# Patient Record
Sex: FEMALE | Race: WHITE | Employment: FULL TIME | URBAN - METROPOLITAN AREA
[De-identification: names, ages, dates, MRNs, and addresses within clinical notes are randomized per-mention and may not be internally consistent; named-entity substitution may affect disease eponyms.]

---

## 2017-01-23 RX ORDER — ALPRAZOLAM 0.25 MG/1
0.25 TABLET ORAL NIGHTLY PRN
COMMUNITY
Start: 2006-12-29 | End: 2017-02-13

## 2017-01-23 RX ORDER — LEVOTHYROXINE SODIUM 0.12 MG/1
TABLET ORAL
COMMUNITY
Start: 2009-01-22 | End: 2017-02-06

## 2017-01-23 RX ORDER — OXYBUTYNIN CHLORIDE 5 MG/1
5 TABLET, EXTENDED RELEASE ORAL DAILY
Qty: 90 TABLET | Refills: 0 | Status: SHIPPED | OUTPATIENT
Start: 2017-01-23 | End: 2017-02-13

## 2017-01-23 RX ORDER — CITALOPRAM 20 MG/1
20 TABLET ORAL DAILY
COMMUNITY
Start: 2015-10-23 | End: 2017-02-13

## 2017-01-23 RX ORDER — OXYBUTYNIN CHLORIDE 5 MG/1
TABLET, EXTENDED RELEASE ORAL
COMMUNITY
Start: 2007-01-29 | End: 2017-01-23

## 2017-01-23 RX ORDER — SIMVASTATIN 10 MG
TABLET ORAL
COMMUNITY
Start: 2011-02-01 | End: 2017-04-03 | Stop reason: ALTCHOICE

## 2017-02-04 ENCOUNTER — LAB ENCOUNTER (OUTPATIENT)
Dept: LAB | Age: 67
End: 2017-02-04
Attending: FAMILY MEDICINE
Payer: COMMERCIAL

## 2017-02-04 DIAGNOSIS — E78.9 DISORDER OF LIPOPROTEIN AND LIPID METABOLISM: Primary | ICD-10-CM

## 2017-02-04 DIAGNOSIS — E03.9 HYPOTHYROIDISM: ICD-10-CM

## 2017-02-04 LAB
ALBUMIN SERPL-MCNC: 4 G/DL (ref 3.5–4.8)
ALP LIVER SERPL-CCNC: 125 U/L (ref 50–130)
ALT SERPL-CCNC: 26 U/L (ref 14–54)
AST SERPL-CCNC: 18 U/L (ref 15–41)
BASOPHILS # BLD AUTO: 0.07 X10(3) UL (ref 0–0.1)
BASOPHILS NFR BLD AUTO: 0.7 %
BILIRUB SERPL-MCNC: 0.4 MG/DL (ref 0.1–2)
BUN BLD-MCNC: 17 MG/DL (ref 8–20)
CALCIUM BLD-MCNC: 9.4 MG/DL (ref 8.3–10.3)
CHLORIDE: 105 MMOL/L (ref 101–111)
CHOLEST SMN-MCNC: 188 MG/DL (ref ?–200)
CO2: 27 MMOL/L (ref 22–32)
CREAT BLD-MCNC: 0.87 MG/DL (ref 0.55–1.02)
EOSINOPHIL # BLD AUTO: 0.16 X10(3) UL (ref 0–0.3)
EOSINOPHIL NFR BLD AUTO: 1.7 %
ERYTHROCYTE [DISTWIDTH] IN BLOOD BY AUTOMATED COUNT: 12.1 % (ref 11.5–16)
GLUCOSE BLD-MCNC: 96 MG/DL (ref 70–99)
HCT VFR BLD AUTO: 46.8 % (ref 34–50)
HDLC SERPL-MCNC: 67 MG/DL (ref 45–?)
HDLC SERPL: 2.81 {RATIO} (ref ?–4.44)
HGB BLD-MCNC: 14.6 G/DL (ref 12–16)
IMMATURE GRANULOCYTE COUNT: 0.05 X10(3) UL (ref 0–1)
IMMATURE GRANULOCYTE RATIO %: 0.5 %
LDLC SERPL CALC-MCNC: 99 MG/DL (ref ?–130)
LYMPHOCYTES # BLD AUTO: 1.79 X10(3) UL (ref 0.9–4)
LYMPHOCYTES NFR BLD AUTO: 18.8 %
M PROTEIN MFR SERPL ELPH: 7.6 G/DL (ref 6.1–8.3)
MCH RBC QN AUTO: 30.4 PG (ref 27–33.2)
MCHC RBC AUTO-ENTMCNC: 31.2 G/DL (ref 31–37)
MCV RBC AUTO: 97.5 FL (ref 81–100)
MONOCYTES # BLD AUTO: 0.64 X10(3) UL (ref 0.1–0.6)
MONOCYTES NFR BLD AUTO: 6.7 %
NEUTROPHIL ABS PRELIM: 6.8 X10 (3) UL (ref 1.3–6.7)
NEUTROPHILS # BLD AUTO: 6.8 X10(3) UL (ref 1.3–6.7)
NEUTROPHILS NFR BLD AUTO: 71.6 %
NONHDLC SERPL-MCNC: 121 MG/DL (ref ?–130)
PLATELET # BLD AUTO: 260 10(3)UL (ref 150–450)
POTASSIUM SERPL-SCNC: 4.4 MMOL/L (ref 3.6–5.1)
RBC # BLD AUTO: 4.8 X10(6)UL (ref 3.8–5.1)
RED CELL DISTRIBUTION WIDTH-SD: 43.7 FL (ref 35.1–46.3)
SODIUM SERPL-SCNC: 140 MMOL/L (ref 136–144)
TRIGLYCERIDES: 108 MG/DL (ref ?–150)
TSI SER-ACNC: 0.04 MIU/ML (ref 0.35–5.5)
VLDL: 22 MG/DL (ref 5–40)
WBC # BLD AUTO: 9.5 X10(3) UL (ref 4–13)

## 2017-02-04 PROCEDURE — 84443 ASSAY THYROID STIM HORMONE: CPT

## 2017-02-04 PROCEDURE — 80061 LIPID PANEL: CPT

## 2017-02-04 PROCEDURE — 85025 COMPLETE CBC W/AUTO DIFF WBC: CPT

## 2017-02-04 PROCEDURE — 80053 COMPREHEN METABOLIC PANEL: CPT

## 2017-02-06 DIAGNOSIS — E03.9 HYPOTHYROIDISM, UNSPECIFIED TYPE: Primary | ICD-10-CM

## 2017-02-06 PROBLEM — E78.9 DISORDER OF LIPID METABOLISM: Status: ACTIVE | Noted: 2017-02-06

## 2017-02-06 RX ORDER — LEVOTHYROXINE SODIUM 112 UG/1
112 TABLET ORAL
Qty: 90 TABLET | Refills: 0 | Status: SHIPPED | OUTPATIENT
Start: 2017-02-06 | End: 2017-02-13

## 2017-02-06 NOTE — TELEPHONE ENCOUNTER
Patient advised. Verbalizes understanding. Patient states she has an appointment with Dr. Lilia Parrish for physical - was unable to schedule with Dr. Tiburcio Rich when she called for appointment. To Dr. Tiburcio Rich. Please sign future TSH order.

## 2017-02-06 NOTE — TELEPHONE ENCOUNTER
Reviewed recent labs  Thyroid dose sl high , recommend decrease to 112 mcg strength  Will need recheck TSH in 8 weeks   Other labs good  No other changes to meds  Will discuss further at appt.

## 2017-02-13 ENCOUNTER — OFFICE VISIT (OUTPATIENT)
Dept: FAMILY MEDICINE CLINIC | Facility: CLINIC | Age: 67
End: 2017-02-13

## 2017-02-13 VITALS
BODY MASS INDEX: 30.45 KG/M2 | DIASTOLIC BLOOD PRESSURE: 64 MMHG | WEIGHT: 185 LBS | TEMPERATURE: 98 F | SYSTOLIC BLOOD PRESSURE: 122 MMHG | HEIGHT: 65.5 IN | HEART RATE: 60 BPM

## 2017-02-13 DIAGNOSIS — E03.9 HYPOTHYROIDISM, UNSPECIFIED TYPE: ICD-10-CM

## 2017-02-13 DIAGNOSIS — Z12.39 SCREENING FOR BREAST CANCER: ICD-10-CM

## 2017-02-13 DIAGNOSIS — Z00.00 PHYSICAL EXAM: Primary | ICD-10-CM

## 2017-02-13 PROCEDURE — 99396 PREV VISIT EST AGE 40-64: CPT | Performed by: FAMILY MEDICINE

## 2017-02-13 PROCEDURE — 99212 OFFICE O/P EST SF 10 MIN: CPT | Performed by: FAMILY MEDICINE

## 2017-02-13 RX ORDER — FLUTICASONE PROPIONATE 50 MCG
1 SPRAY, SUSPENSION (ML) NASAL DAILY PRN
COMMUNITY
End: 2017-04-03

## 2017-02-13 RX ORDER — LEVOTHYROXINE SODIUM 112 UG/1
112 TABLET ORAL
Qty: 90 TABLET | Refills: 3 | Status: SHIPPED
Start: 2017-02-13 | End: 2017-10-09

## 2017-02-13 RX ORDER — CITALOPRAM 20 MG/1
20 TABLET ORAL DAILY
Qty: 90 TABLET | Refills: 3 | Status: SHIPPED
Start: 2017-02-13 | End: 2017-10-09

## 2017-02-13 RX ORDER — ALPRAZOLAM 0.25 MG/1
0.25 TABLET ORAL NIGHTLY PRN
Qty: 60 TABLET | Refills: 0 | Status: SHIPPED
Start: 2017-02-13 | End: 2017-04-03

## 2017-02-13 RX ORDER — OXYBUTYNIN CHLORIDE 5 MG/1
5 TABLET, EXTENDED RELEASE ORAL DAILY
Qty: 90 TABLET | Refills: 3 | Status: SHIPPED
Start: 2017-02-13 | End: 2017-10-09

## 2017-02-14 NOTE — PATIENT INSTRUCTIONS
Normal physical today . Advice to schedule mammogram at [facility]. Healthy diet and exercise.

## 2017-02-14 NOTE — PROGRESS NOTES
HPI:     Nehal Delarosa is a 61year old female who presents for an Annual Health Visit. Patient also has history of hypothyroidism. Tolerating medication well. Denies any fatigue, cold intolerance, constipation, diarrhea or any palpitation. lesions or rashes  EYES: no visual complaints or deficits  HEENT: denies nasal congestion, sinus pain or sore throat; hearing loss negative  RESPIRATORY: denies shortness of breath, wheezing or cough   CARDIOVASCULAR: denies chest pain or DBOBS; no palpitati oriented x 3; affect appropriate      ASSESSMENT AND OTHER RELEVANT CHRONIC CONDITIONS:     Meds & Refills for this Visit:  Signed Prescriptions Disp Refills    Levothyroxine Sodium 112 MCG Oral Tab 90 tablet 3      Sig: Take 1 tablet (112 mcg total) by mo metabolism     Encounter for general adult medical examination w/o abnormal findings     Encounter for routine gynecological examination     Hematuria     Hypothyroidism     H/O vaginal hysterectomy     H/O partial mastectomy     Organic mood disorder

## 2017-04-03 ENCOUNTER — OFFICE VISIT (OUTPATIENT)
Dept: FAMILY MEDICINE CLINIC | Facility: CLINIC | Age: 67
End: 2017-04-03

## 2017-04-03 VITALS
WEIGHT: 182.19 LBS | TEMPERATURE: 98 F | HEART RATE: 78 BPM | SYSTOLIC BLOOD PRESSURE: 110 MMHG | DIASTOLIC BLOOD PRESSURE: 64 MMHG | BODY MASS INDEX: 30 KG/M2 | RESPIRATION RATE: 20 BRPM

## 2017-04-03 DIAGNOSIS — F41.9 ANXIETY: ICD-10-CM

## 2017-04-03 DIAGNOSIS — B35.9 RINGWORM: Primary | ICD-10-CM

## 2017-04-03 PROCEDURE — 99214 OFFICE O/P EST MOD 30 MIN: CPT | Performed by: FAMILY MEDICINE

## 2017-04-03 RX ORDER — FLUTICASONE PROPIONATE 50 MCG
1 SPRAY, SUSPENSION (ML) NASAL DAILY PRN
Qty: 16 G | Refills: 0 | Status: SHIPPED | OUTPATIENT
Start: 2017-04-03 | End: 2017-06-21

## 2017-04-03 RX ORDER — ALPRAZOLAM 0.25 MG/1
0.25 TABLET ORAL NIGHTLY PRN
Qty: 90 TABLET | Refills: 0 | Status: SHIPPED | OUTPATIENT
Start: 2017-04-03 | End: 2017-07-24

## 2017-04-03 NOTE — PROGRESS NOTES
[facility] [place]  PROGRESS NOTE  Chief Complaint:   Patient presents with:  Rash: since Febuary       HPI:   This is a 61year old female presents complaining of a rash for past 2 months. Rash has slowly getting worse.   She has applied variou (3) uL   Neutrophil Absolute 6.80 (H) 1.30-6.70 x10(3) uL   Lymphocyte Absolute 1.79 0.90-4.00 x10(3) uL   Monocyte Absolute 0.64 (H) 0.10-0.60 x10(3) uL   Eosinophil Absolute 0.16 0.00-0.30 x10(3) uL   Basophil Absolute 0.07 0.00-0.10 x10(3) uL   Immature Propionate 50 MCG/ACT Nasal Suspension 1 spray by Each Nare route daily as needed for Rhinitis. Disp: 16 g Rfl: 0      Counseling given: Not Answered         REVIEW OF SYSTEMS:   CONSTITUTIONAL:  Denies unusual weight gain/loss, fever, chills, or fatigue. quadrants, no Masses, no hepatosplenomegaly. SKIN: Half-moon shaped erythematous, slightly hyperpigmented dry rash over right buttocks, nontender, no warmth, no drainage present. LYMPHATIC: No cervical lymphadenopathy, no other lymphadenopathy.   Bascom Litten intervertebral disc     Diverticulosis of colon     Disorder of lipid metabolism     Encounter for general adult medical examination w/o abnormal findings     Encounter for routine gynecological examination     Hematuria     Hypothyroidism     H/O vaginal

## 2017-04-03 NOTE — PATIENT INSTRUCTIONS
Continue current medications   Use cream as needed   Keep area clean and dry  Return to clinic if any increase redness, pain, warmth, fever or oozing. Return to clinic in 1-2 weeks if no improvement. Sooner if symptoms gets worse.

## 2017-05-05 ENCOUNTER — TELEPHONE (OUTPATIENT)
Dept: FAMILY MEDICINE CLINIC | Facility: CLINIC | Age: 67
End: 2017-05-05

## 2017-05-05 DIAGNOSIS — E03.9 HYPOTHYROIDISM, UNSPECIFIED TYPE: Primary | ICD-10-CM

## 2017-05-05 NOTE — TELEPHONE ENCOUNTER
Outstanding order for TSH was due on 4/3/17. Patient notified. Patient states she just had TSH done in February and insurance will not cover another one so soon. States her PCP is Dr. Terry Mcginnis. Please advise if this test is still needed or recommended?

## 2017-05-05 NOTE — TELEPHONE ENCOUNTER
Patient states that she cant come in this coming week. Her granddaughter is being born soon. Once things settle down she will call and make appt. No other questions at this time.

## 2017-06-21 RX ORDER — FLUTICASONE PROPIONATE 50 MCG
1 SPRAY, SUSPENSION (ML) NASAL DAILY PRN
Qty: 16 G | Refills: 0 | Status: SHIPPED | OUTPATIENT
Start: 2017-06-21 | End: 2017-10-09

## 2017-07-24 RX ORDER — ALPRAZOLAM 0.25 MG/1
TABLET ORAL
Qty: 60 TABLET | Refills: 3 | Status: SHIPPED | OUTPATIENT
Start: 2017-07-24 | End: 2017-10-09

## 2017-07-24 NOTE — TELEPHONE ENCOUNTER
Future Appointments  Date Time Provider Jackelin Varmai   10/9/2017 5:30 PM Maria Luisa Logan MD [facility] [place] [facility] [place]     Cant come before Sept. Patient states that she owes money to [facility] and until she qualifies for Medicare she cant afford another appt

## 2017-07-27 RX ORDER — ALPRAZOLAM 0.25 MG/1
TABLET ORAL
Qty: 90 TABLET | Refills: 0 | Status: SHIPPED | OUTPATIENT
Start: 2017-07-27 | End: 2017-10-09

## 2017-07-31 ENCOUNTER — TELEPHONE (OUTPATIENT)
Dept: FAMILY MEDICINE CLINIC | Facility: CLINIC | Age: 67
End: 2017-07-31

## 2017-08-01 NOTE — TELEPHONE ENCOUNTER
[facility]ed [facility] states patient just picked up refill of xanax was sent 7/24/17 for 60 with 3 refills. Did not need another Rx will cancel order.

## 2017-08-11 ENCOUNTER — TELEPHONE (OUTPATIENT)
Dept: FAMILY MEDICINE CLINIC | Facility: CLINIC | Age: 67
End: 2017-08-11

## 2017-09-11 ENCOUNTER — TELEPHONE (OUTPATIENT)
Dept: FAMILY MEDICINE CLINIC | Facility: CLINIC | Age: 67
End: 2017-09-11

## 2017-09-11 DIAGNOSIS — Z00.00 HEALTH CARE MAINTENANCE: ICD-10-CM

## 2017-09-11 DIAGNOSIS — E03.9 HYPOTHYROIDISM, UNSPECIFIED TYPE: Primary | ICD-10-CM

## 2017-09-11 NOTE — TELEPHONE ENCOUNTER
Patient has upcoming welcome to medicare px in October. Patient would like bloodwork orders entered so she can have results at .  Please advise

## 2017-09-27 ENCOUNTER — LABORATORY ENCOUNTER (OUTPATIENT)
Dept: LAB | Age: 67
End: 2017-09-27
Attending: FAMILY MEDICINE
Payer: COMMERCIAL

## 2017-09-27 DIAGNOSIS — Z00.00 HEALTH CARE MAINTENANCE: ICD-10-CM

## 2017-09-27 DIAGNOSIS — E03.9 HYPOTHYROIDISM, UNSPECIFIED TYPE: ICD-10-CM

## 2017-09-27 LAB
ALBUMIN SERPL-MCNC: 4 G/DL (ref 3.5–4.8)
ALP LIVER SERPL-CCNC: 118 U/L (ref 50–130)
ALT SERPL-CCNC: 25 U/L (ref 14–54)
AST SERPL-CCNC: 16 U/L (ref 15–41)
BASOPHILS # BLD AUTO: 0.06 X10(3) UL (ref 0–0.1)
BASOPHILS NFR BLD AUTO: 0.7 %
BILIRUB SERPL-MCNC: 0.6 MG/DL (ref 0.1–2)
BUN BLD-MCNC: 19 MG/DL (ref 8–20)
CALCIUM BLD-MCNC: 9.3 MG/DL (ref 8.3–10.3)
CHLORIDE: 103 MMOL/L (ref 101–111)
CHOLEST SMN-MCNC: 220 MG/DL (ref ?–200)
CO2: 27 MMOL/L (ref 22–32)
CREAT BLD-MCNC: 0.93 MG/DL (ref 0.55–1.02)
EOSINOPHIL # BLD AUTO: 0.07 X10(3) UL (ref 0–0.3)
EOSINOPHIL NFR BLD AUTO: 0.8 %
ERYTHROCYTE [DISTWIDTH] IN BLOOD BY AUTOMATED COUNT: 12.3 % (ref 11.5–16)
GLUCOSE BLD-MCNC: 92 MG/DL (ref 70–99)
HCT VFR BLD AUTO: 46.3 % (ref 34–50)
HDLC SERPL-MCNC: 69 MG/DL (ref 45–?)
HDLC SERPL: 3.19 {RATIO} (ref ?–4.44)
HGB BLD-MCNC: 14.8 G/DL (ref 12–16)
IMMATURE GRANULOCYTE COUNT: 0.04 X10(3) UL (ref 0–1)
IMMATURE GRANULOCYTE RATIO %: 0.5 %
LDLC SERPL CALC-MCNC: 133 MG/DL (ref ?–130)
LDLC SERPL-MCNC: 18 MG/DL (ref 5–40)
LYMPHOCYTES # BLD AUTO: 1.73 X10(3) UL (ref 0.9–4)
LYMPHOCYTES NFR BLD AUTO: 19.9 %
M PROTEIN MFR SERPL ELPH: 7.6 G/DL (ref 6.1–8.3)
MCH RBC QN AUTO: 30.6 PG (ref 27–33.2)
MCHC RBC AUTO-ENTMCNC: 32 G/DL (ref 31–37)
MCV RBC AUTO: 95.9 FL (ref 81–100)
MONOCYTES # BLD AUTO: 0.57 X10(3) UL (ref 0.1–0.6)
MONOCYTES NFR BLD AUTO: 6.6 %
NEUTROPHIL ABS PRELIM: 6.21 X10 (3) UL (ref 1.3–6.7)
NEUTROPHILS # BLD AUTO: 6.21 X10(3) UL (ref 1.3–6.7)
NEUTROPHILS NFR BLD AUTO: 71.5 %
NONHDLC SERPL-MCNC: 151 MG/DL (ref ?–130)
PLATELET # BLD AUTO: 276 10(3)UL (ref 150–450)
POTASSIUM SERPL-SCNC: 4.7 MMOL/L (ref 3.6–5.1)
RBC # BLD AUTO: 4.83 X10(6)UL (ref 3.8–5.1)
RED CELL DISTRIBUTION WIDTH-SD: 42.5 FL (ref 35.1–46.3)
SODIUM SERPL-SCNC: 139 MMOL/L (ref 136–144)
TRIGLYCERIDES: 89 MG/DL (ref ?–150)
TSI SER-ACNC: 0.13 MIU/ML (ref 0.35–5.5)
WBC # BLD AUTO: 8.7 X10(3) UL (ref 4–13)

## 2017-09-27 PROCEDURE — 84443 ASSAY THYROID STIM HORMONE: CPT

## 2017-09-27 PROCEDURE — 80053 COMPREHEN METABOLIC PANEL: CPT

## 2017-09-27 PROCEDURE — 85025 COMPLETE CBC W/AUTO DIFF WBC: CPT

## 2017-09-27 PROCEDURE — 36415 COLL VENOUS BLD VENIPUNCTURE: CPT

## 2017-09-27 PROCEDURE — 80061 LIPID PANEL: CPT

## 2017-09-28 ENCOUNTER — TELEPHONE (OUTPATIENT)
Dept: FAMILY MEDICINE CLINIC | Facility: CLINIC | Age: 67
End: 2017-09-28

## 2017-09-28 NOTE — TELEPHONE ENCOUNTER
----- Message from Td Nathan MD sent at 9/27/2017  9:30 PM CDT -----  Thyroid level good  Chol sl higher, but HDL remains good  No changes in meds  Will discuss at appt

## 2017-10-09 ENCOUNTER — OFFICE VISIT (OUTPATIENT)
Dept: FAMILY MEDICINE CLINIC | Facility: CLINIC | Age: 67
End: 2017-10-09

## 2017-10-09 VITALS
RESPIRATION RATE: 16 BRPM | DIASTOLIC BLOOD PRESSURE: 70 MMHG | TEMPERATURE: 98 F | WEIGHT: 178 LBS | SYSTOLIC BLOOD PRESSURE: 122 MMHG | HEIGHT: 66 IN | HEART RATE: 68 BPM | BODY MASS INDEX: 28.61 KG/M2

## 2017-10-09 DIAGNOSIS — J01.00 ACUTE NON-RECURRENT MAXILLARY SINUSITIS: ICD-10-CM

## 2017-10-09 DIAGNOSIS — F41.9 ANXIETY: ICD-10-CM

## 2017-10-09 DIAGNOSIS — Z00.00 HEALTH CARE MAINTENANCE: ICD-10-CM

## 2017-10-09 DIAGNOSIS — E03.9 HYPOTHYROIDISM, UNSPECIFIED TYPE: Primary | ICD-10-CM

## 2017-10-09 PROCEDURE — 99214 OFFICE O/P EST MOD 30 MIN: CPT | Performed by: FAMILY MEDICINE

## 2017-10-09 RX ORDER — CITALOPRAM 20 MG/1
20 TABLET ORAL DAILY
Qty: 90 TABLET | Refills: 1 | Status: SHIPPED | OUTPATIENT
Start: 2017-10-09 | End: 2018-01-01

## 2017-10-09 RX ORDER — LEVOFLOXACIN 500 MG/1
500 TABLET, FILM COATED ORAL DAILY
Qty: 10 TABLET | Refills: 0 | Status: SHIPPED | OUTPATIENT
Start: 2017-10-09 | End: 2017-10-19

## 2017-10-09 RX ORDER — LEVOTHYROXINE SODIUM 112 UG/1
112 TABLET ORAL
Qty: 90 TABLET | Refills: 1 | Status: SHIPPED | OUTPATIENT
Start: 2017-10-09 | End: 2018-01-01

## 2017-10-09 RX ORDER — CITALOPRAM 20 MG/1
20 TABLET ORAL DAILY
Qty: 90 TABLET | Refills: 1 | Status: SHIPPED | OUTPATIENT
Start: 2017-10-09 | End: 2017-10-09

## 2017-10-09 RX ORDER — FLUTICASONE PROPIONATE 50 MCG
1 SPRAY, SUSPENSION (ML) NASAL DAILY PRN
Qty: 3 BOTTLE | Refills: 6 | Status: SHIPPED | OUTPATIENT
Start: 2017-10-09 | End: 2018-01-01

## 2017-10-09 RX ORDER — OXYBUTYNIN CHLORIDE 5 MG/1
5 TABLET, EXTENDED RELEASE ORAL DAILY
Qty: 90 TABLET | Refills: 1 | Status: SHIPPED | OUTPATIENT
Start: 2017-10-09 | End: 2018-01-01

## 2017-10-09 RX ORDER — ALPRAZOLAM 0.25 MG/1
0.25 TABLET ORAL NIGHTLY PRN
Qty: 60 TABLET | Refills: 6 | Status: SHIPPED | OUTPATIENT
Start: 2017-10-09 | End: 2018-01-01

## 2017-10-10 ENCOUNTER — TELEPHONE (OUTPATIENT)
Dept: FAMILY MEDICINE CLINIC | Facility: CLINIC | Age: 67
End: 2017-10-10

## 2017-10-10 NOTE — TELEPHONE ENCOUNTER
Pharmacy called regarding medications that were sent in yesterday. Levaquin and citalopram. These two will counteract with each other and can cause QT prolongation and arrhythmias. Do you still want pharmacy to fill medications?  Please advise

## 2017-10-10 NOTE — PROGRESS NOTES
[address]  PROGRESS NOTE  Chief Complaint:   Patient presents with:  Lab Results: from 9/27  Medication Request: Celexa, Flonase, Xanax, Synthroid, Nystatin, Ditroptan. Pt would like paper scripts to take to pharmacy herself.    Sinus Pr 4.0 3.5 - 4.8 g/dL   Sodium 139 136 - 144 mmol/L   Potassium 4.7 3.6 - 5.1 mmol/L   Chloride 103 101 - 111 mmol/L   CO2 27.0 22.0 - 32.0 mmol/L   -LIPID PANEL   Result Value Ref Range   Cholesterol, Total 220 (H) <200 mg/dL   Triglycerides 89 <150 mg/dL Disease Father    • Coronary artery disease [OTHER] Father    • Stroke Paternal Grandmother      Allergies:    Penicillins             Itching  Current Meds:    Current Outpatient Prescriptions:  levofloxacin (LEVAQUIN) 500 MG Oral Tab Take 1 tablet (500 m in the extremities,change in bowel or bladder control. HEMATOLOGIC:  Denies anemia, bleeding or bruising. LYMPHATICS:  Denies enlarged nodes or history of splenectomy. PSYCHIATRIC:  Denies depression or anxiety.   ENDOCRINOLOGIC:  Denies excessive sweati levofloxacin (LEVAQUIN) 500 MG Oral Tab; Take 1 tablet (500 mg total) by mouth daily.  -     ALPRAZolam 0.25 MG Oral Tab; Take 1 tablet (0.25 mg total) by mouth nightly as needed for Sleep.   -     Discontinue: Citalopram Hydrobromide (CELEXA) 20 MG Oral Ta List:  Patient Active Problem List:     Abnormal electrocardiogram     S/P appendectomy     History of colonic polyps     Degeneration of lumbar or lumbosacral intervertebral disc     Diverticulosis of colon     Disorder of lipid metabolism     Hematuria

## 2018-01-01 ENCOUNTER — TELEPHONE (OUTPATIENT)
Dept: HEMATOLOGY/ONCOLOGY | Facility: HOSPITAL | Age: 68
End: 2018-01-01

## 2018-01-01 ENCOUNTER — SOCIAL WORK SERVICES (OUTPATIENT)
Dept: HEMATOLOGY/ONCOLOGY | Facility: HOSPITAL | Age: 68
End: 2018-01-01

## 2018-01-01 ENCOUNTER — HOSPITAL ENCOUNTER (OUTPATIENT)
Dept: RADIATION ONCOLOGY | Facility: HOSPITAL | Age: 68
Discharge: HOME | End: 2018-01-01
Attending: RADIOLOGY
Payer: MEDICARE

## 2018-01-01 ENCOUNTER — OFFICE VISIT (OUTPATIENT)
Dept: HEMATOLOGY/ONCOLOGY | Facility: HOSPITAL | Age: 68
End: 2018-01-01
Attending: INTERNAL MEDICINE
Payer: MEDICARE

## 2018-01-01 ENCOUNTER — TELEPHONE (OUTPATIENT)
Dept: FAMILY MEDICINE CLINIC | Facility: CLINIC | Age: 68
End: 2018-01-01

## 2018-01-01 ENCOUNTER — HOSPITAL ENCOUNTER (OUTPATIENT)
Dept: NUCLEAR MEDICINE | Facility: HOSPITAL | Age: 68
Discharge: HOME | End: 2018-01-01
Attending: INTERNAL MEDICINE
Payer: MEDICARE

## 2018-01-01 ENCOUNTER — DIETICIAN VISIT (OUTPATIENT)
Dept: HEMATOLOGY/ONCOLOGY | Facility: HOSPITAL | Age: 68
End: 2018-01-01

## 2018-01-01 ENCOUNTER — RESEARCH ENCOUNTER (OUTPATIENT)
Dept: HEMATOLOGY/ONCOLOGY | Facility: HOSPITAL | Age: 68
End: 2018-01-01

## 2018-01-01 ENCOUNTER — OFFICE VISIT (OUTPATIENT)
Dept: FAMILY MEDICINE CLINIC | Facility: CLINIC | Age: 68
End: 2018-01-01
Payer: COMMERCIAL

## 2018-01-01 ENCOUNTER — TELEPHONE (OUTPATIENT)
Dept: RADIATION ONCOLOGY | Age: 68
End: 2018-01-01

## 2018-01-01 ENCOUNTER — MED REC SCAN ONLY (OUTPATIENT)
Dept: HEMATOLOGY/ONCOLOGY | Facility: HOSPITAL | Age: 68
End: 2018-01-01

## 2018-01-01 ENCOUNTER — HOSPITAL ENCOUNTER (OUTPATIENT)
Dept: GENERAL RADIOLOGY | Age: 68
Discharge: HOME | End: 2018-01-01
Attending: FAMILY MEDICINE
Payer: MEDICARE

## 2018-01-01 VITALS
HEART RATE: 80 BPM | WEIGHT: 184.19 LBS | DIASTOLIC BLOOD PRESSURE: 78 MMHG | SYSTOLIC BLOOD PRESSURE: 128 MMHG | RESPIRATION RATE: 20 BRPM | HEIGHT: 66 IN | BODY MASS INDEX: 29.6 KG/M2 | TEMPERATURE: 98 F

## 2018-01-01 VITALS
BODY MASS INDEX: 28.93 KG/M2 | HEIGHT: 65.98 IN | RESPIRATION RATE: 18 BRPM | OXYGEN SATURATION: 98 % | TEMPERATURE: 98 F | WEIGHT: 180 LBS | SYSTOLIC BLOOD PRESSURE: 138 MMHG | HEART RATE: 70 BPM | DIASTOLIC BLOOD PRESSURE: 80 MMHG

## 2018-01-01 VITALS
RESPIRATION RATE: 18 BRPM | WEIGHT: 175.19 LBS | HEART RATE: 78 BPM | OXYGEN SATURATION: 99 % | BODY MASS INDEX: 28.15 KG/M2 | HEIGHT: 65.98 IN | SYSTOLIC BLOOD PRESSURE: 121 MMHG | TEMPERATURE: 98 F | DIASTOLIC BLOOD PRESSURE: 68 MMHG

## 2018-01-01 VITALS
OXYGEN SATURATION: 97 % | TEMPERATURE: 98 F | BODY MASS INDEX: 28.83 KG/M2 | HEART RATE: 101 BPM | DIASTOLIC BLOOD PRESSURE: 77 MMHG | RESPIRATION RATE: 18 BRPM | WEIGHT: 179.38 LBS | HEIGHT: 65.98 IN | SYSTOLIC BLOOD PRESSURE: 147 MMHG

## 2018-01-01 VITALS
SYSTOLIC BLOOD PRESSURE: 128 MMHG | HEIGHT: 65.98 IN | HEART RATE: 83 BPM | OXYGEN SATURATION: 91 % | DIASTOLIC BLOOD PRESSURE: 63 MMHG | WEIGHT: 180 LBS | TEMPERATURE: 98 F | RESPIRATION RATE: 20 BRPM | BODY MASS INDEX: 28.93 KG/M2

## 2018-01-01 DIAGNOSIS — C79.51 BONE METASTASES (HCC): ICD-10-CM

## 2018-01-01 DIAGNOSIS — B02.9 HERPES ZOSTER WITHOUT COMPLICATION: ICD-10-CM

## 2018-01-01 DIAGNOSIS — C79.31 BRAIN METASTASES (HCC): ICD-10-CM

## 2018-01-01 DIAGNOSIS — M50.30 DEGENERATIVE DISC DISEASE, CERVICAL: ICD-10-CM

## 2018-01-01 DIAGNOSIS — F41.9 ANXIETY: ICD-10-CM

## 2018-01-01 DIAGNOSIS — C34.02 LUNG CANCER, MAIN BRONCHUS, LEFT (HCC): ICD-10-CM

## 2018-01-01 DIAGNOSIS — G89.3 CANCER ASSOCIATED PAIN: ICD-10-CM

## 2018-01-01 DIAGNOSIS — E03.9 HYPOTHYROIDISM, UNSPECIFIED TYPE: ICD-10-CM

## 2018-01-01 DIAGNOSIS — R55 NEAR SYNCOPE: Primary | ICD-10-CM

## 2018-01-01 DIAGNOSIS — I63.449 CEREBROVASCULAR ACCIDENT (CVA) DUE TO EMBOLIC OCCLUSION OF CEREBELLAR ARTERY (HCC): ICD-10-CM

## 2018-01-01 DIAGNOSIS — I82.412 ACUTE DEEP VEIN THROMBOSIS (DVT) OF FEMORAL VEIN OF LEFT LOWER EXTREMITY (HCC): ICD-10-CM

## 2018-01-01 DIAGNOSIS — R60.0 BILATERAL LEG EDEMA: ICD-10-CM

## 2018-01-01 DIAGNOSIS — I26.99 OTHER ACUTE PULMONARY EMBOLISM WITHOUT ACUTE COR PULMONALE (HCC): ICD-10-CM

## 2018-01-01 DIAGNOSIS — C79.31 BRAIN METASTASES (HCC): Primary | ICD-10-CM

## 2018-01-01 DIAGNOSIS — C34.02 MALIGNANT NEOPLASM OF LEFT MAIN BRONCHUS (HCC): ICD-10-CM

## 2018-01-01 DIAGNOSIS — C79.31 SECONDARY CANCER OF BRAIN (HCC): Primary | ICD-10-CM

## 2018-01-01 DIAGNOSIS — C34.02 LUNG CANCER, MAIN BRONCHUS, LEFT (HCC): Primary | ICD-10-CM

## 2018-01-01 DIAGNOSIS — R58 ECCHYMOSIS: ICD-10-CM

## 2018-01-01 DIAGNOSIS — C79.31 BRAIN METASTASIS (HCC): Primary | ICD-10-CM

## 2018-01-01 DIAGNOSIS — C34.82 MALIGNANT NEOPLASM OF OVERLAPPING SITES OF LEFT LUNG (HCC): Primary | ICD-10-CM

## 2018-01-01 DIAGNOSIS — M50.30 DEGENERATIVE DISC DISEASE, CERVICAL: Primary | ICD-10-CM

## 2018-01-01 DIAGNOSIS — C34.82 MALIGNANT NEOPLASM OF OVERLAPPING SITES OF LEFT LUNG (HCC): ICD-10-CM

## 2018-01-01 DIAGNOSIS — C34.02 MALIGNANT NEOPLASM OF LEFT MAIN BRONCHUS (HCC): Primary | ICD-10-CM

## 2018-01-01 DIAGNOSIS — B37.49 CANDIDIASIS OF UROGENITAL SITE: ICD-10-CM

## 2018-01-01 DIAGNOSIS — C34.90 MALIGNANT NEOPLASM OF BRONCHUS AND LUNG (HCC): Primary | ICD-10-CM

## 2018-01-01 DIAGNOSIS — B37.0 ORAL PHARYNGEAL CANDIDIASIS: ICD-10-CM

## 2018-01-01 DIAGNOSIS — Z71.9 ENCOUNTER FOR HEALTH EDUCATION: ICD-10-CM

## 2018-01-01 LAB
ALBUMIN SERPL-MCNC: 3.8 G/DL (ref 3.5–4.8)
ALBUMIN/GLOB SERPL: 1 {RATIO} (ref 1–2)
ALP LIVER SERPL-CCNC: 176 U/L (ref 50–130)
ALT SERPL-CCNC: 26 U/L (ref 14–54)
ANION GAP SERPL CALC-SCNC: 8 MMOL/L (ref 0–18)
AST SERPL-CCNC: 16 U/L (ref 15–41)
BASOPHILS # BLD AUTO: 0.06 X10(3) UL (ref 0–0.1)
BASOPHILS NFR BLD AUTO: 0.5 %
BILIRUB SERPL-MCNC: 0.2 MG/DL (ref 0.1–2)
BUN BLD-MCNC: 21 MG/DL (ref 8–20)
BUN/CREAT SERPL: 21.4 (ref 10–20)
CALCIUM BLD-MCNC: 9 MG/DL (ref 8.3–10.3)
CHLORIDE SERPL-SCNC: 107 MMOL/L (ref 101–111)
CO2 SERPL-SCNC: 27 MMOL/L (ref 22–32)
CREAT BLD-MCNC: 0.98 MG/DL (ref 0.55–1.02)
EOSINOPHIL # BLD AUTO: 0.17 X10(3) UL (ref 0–0.3)
EOSINOPHIL NFR BLD AUTO: 1.5 %
ERYTHROCYTE [DISTWIDTH] IN BLOOD BY AUTOMATED COUNT: 12.5 % (ref 11.5–16)
GLOBULIN PLAS-MCNC: 3.7 G/DL (ref 2.5–4)
GLUCOSE BLD-MCNC: 110 MG/DL (ref 70–99)
HCT VFR BLD AUTO: 42.1 % (ref 34–50)
HGB BLD-MCNC: 13.8 G/DL (ref 12–16)
IMMATURE GRANULOCYTE COUNT: 0.05 X10(3) UL (ref 0–1)
IMMATURE GRANULOCYTE RATIO %: 0.4 %
LYMPHOCYTES # BLD AUTO: 2.38 X10(3) UL (ref 0.9–4)
LYMPHOCYTES NFR BLD AUTO: 20.7 %
M PROTEIN MFR SERPL ELPH: 7.5 G/DL (ref 6.1–8.3)
MCH RBC QN AUTO: 31.7 PG (ref 27–33.2)
MCHC RBC AUTO-ENTMCNC: 32.8 G/DL (ref 31–37)
MCV RBC AUTO: 96.8 FL (ref 81–100)
MONOCYTES # BLD AUTO: 0.81 X10(3) UL (ref 0.1–1)
MONOCYTES NFR BLD AUTO: 7.1 %
NEUTROPHIL ABS PRELIM: 8.01 X10 (3) UL (ref 1.3–6.7)
NEUTROPHILS # BLD AUTO: 8.01 X10(3) UL (ref 1.3–6.7)
NEUTROPHILS NFR BLD AUTO: 69.8 %
OSMOLALITY SERPL CALC.SUM OF ELEC: 298 MOSM/KG (ref 275–295)
PLATELET # BLD AUTO: 307 10(3)UL (ref 150–450)
POTASSIUM SERPL-SCNC: 3.9 MMOL/L (ref 3.6–5.1)
RBC # BLD AUTO: 4.35 X10(6)UL (ref 3.8–5.1)
RED CELL DISTRIBUTION WIDTH-SD: 44.3 FL (ref 35.1–46.3)
SODIUM SERPL-SCNC: 142 MMOL/L (ref 136–144)
T4 FREE SERPL-MCNC: 1.1 NG/DL (ref 0.9–1.8)
TSI SER-ACNC: 3.16 MIU/ML (ref 0.35–5.5)
WBC # BLD AUTO: 11.5 X10(3) UL (ref 4–13)

## 2018-01-01 PROCEDURE — 72050 X-RAY EXAM NECK SPINE 4/5VWS: CPT | Performed by: FAMILY MEDICINE

## 2018-01-01 PROCEDURE — 77399 UNLISTED PX MED RADJ PHYSICS: CPT | Performed by: RADIOLOGY

## 2018-01-01 PROCEDURE — 99215 OFFICE O/P EST HI 40 MIN: CPT | Performed by: CLINICAL NURSE SPECIALIST

## 2018-01-01 PROCEDURE — 77470 SPECIAL RADIATION TREATMENT: CPT | Performed by: RADIOLOGY

## 2018-01-01 PROCEDURE — 80053 COMPREHEN METABOLIC PANEL: CPT | Performed by: FAMILY MEDICINE

## 2018-01-01 PROCEDURE — 77300 RADIATION THERAPY DOSE PLAN: CPT | Performed by: RADIOLOGY

## 2018-01-01 PROCEDURE — 77334 RADIATION TREATMENT AID(S): CPT | Performed by: RADIOLOGY

## 2018-01-01 PROCEDURE — 77290 THER RAD SIMULAJ FIELD CPLX: CPT | Performed by: RADIOLOGY

## 2018-01-01 PROCEDURE — 77373 STRTCTC BDY RAD THER TX DLVR: CPT | Performed by: RADIOLOGY

## 2018-01-01 PROCEDURE — 77280 THER RAD SIMULAJ FIELD SMPL: CPT | Performed by: RADIOLOGY

## 2018-01-01 PROCEDURE — 99215 OFFICE O/P EST HI 40 MIN: CPT | Performed by: INTERNAL MEDICINE

## 2018-01-01 PROCEDURE — 82962 GLUCOSE BLOOD TEST: CPT

## 2018-01-01 PROCEDURE — 77307 TELETHX ISODOSE PLAN CPLX: CPT | Performed by: RADIOLOGY

## 2018-01-01 PROCEDURE — 99205 OFFICE O/P NEW HI 60 MIN: CPT | Performed by: INTERNAL MEDICINE

## 2018-01-01 PROCEDURE — 99214 OFFICE O/P EST MOD 30 MIN: CPT | Performed by: FAMILY MEDICINE

## 2018-01-01 PROCEDURE — 85025 COMPLETE CBC W/AUTO DIFF WBC: CPT | Performed by: FAMILY MEDICINE

## 2018-01-01 PROCEDURE — 84439 ASSAY OF FREE THYROXINE: CPT | Performed by: FAMILY MEDICINE

## 2018-01-01 PROCEDURE — 77336 RADIATION PHYSICS CONSULT: CPT | Performed by: RADIOLOGY

## 2018-01-01 PROCEDURE — 84443 ASSAY THYROID STIM HORMONE: CPT | Performed by: FAMILY MEDICINE

## 2018-01-01 PROCEDURE — 78815 PET IMAGE W/CT SKULL-THIGH: CPT | Performed by: INTERNAL MEDICINE

## 2018-01-01 PROCEDURE — 77295 3-D RADIOTHERAPY PLAN: CPT | Performed by: RADIOLOGY

## 2018-01-01 PROCEDURE — 93000 ELECTROCARDIOGRAM COMPLETE: CPT | Performed by: FAMILY MEDICINE

## 2018-01-01 RX ORDER — CITALOPRAM 20 MG/1
TABLET ORAL
Qty: 90 TABLET | Refills: 1 | Status: SHIPPED | OUTPATIENT
Start: 2018-01-01 | End: 2018-01-01

## 2018-01-01 RX ORDER — ALPRAZOLAM 0.25 MG/1
TABLET ORAL
Qty: 60 TABLET | Refills: 5 | Status: SHIPPED | OUTPATIENT
Start: 2018-01-01 | End: 2018-01-01

## 2018-01-01 RX ORDER — FLUCONAZOLE 100 MG/1
TABLET ORAL
Qty: 8 TABLET | Refills: 1 | Status: SHIPPED | OUTPATIENT
Start: 2018-01-01

## 2018-01-01 RX ORDER — AMLODIPINE BESYLATE 10 MG/1
10 TABLET ORAL DAILY
Qty: 90 TABLET | Refills: 0 | Status: SHIPPED | OUTPATIENT
Start: 2018-01-01

## 2018-01-01 RX ORDER — HYDROCODONE BITARTRATE AND ACETAMINOPHEN 5; 325 MG/1; MG/1
1 TABLET ORAL 3 TIMES DAILY PRN
Qty: 90 TABLET | Refills: 0 | Status: SHIPPED | OUTPATIENT
Start: 2018-01-01

## 2018-01-01 RX ORDER — ENOXAPARIN SODIUM 100 MG/ML
1 INJECTION SUBCUTANEOUS 2 TIMES DAILY
Qty: 13 SYRINGE | Refills: 3 | Status: SHIPPED | OUTPATIENT
Start: 2018-01-01

## 2018-01-01 RX ORDER — ENOXAPARIN SODIUM 100 MG/ML
1 INJECTION SUBCUTANEOUS 2 TIMES DAILY
Qty: 13 SYRINGE | Refills: 3 | Status: SHIPPED | OUTPATIENT
Start: 2018-01-01 | End: 2018-01-01

## 2018-01-01 RX ORDER — PANTOPRAZOLE SODIUM 40 MG/1
40 TABLET, DELAYED RELEASE ORAL
Qty: 30 TABLET | Refills: 3 | Status: SHIPPED | OUTPATIENT
Start: 2018-01-01

## 2018-01-01 RX ORDER — ALPRAZOLAM 0.25 MG/1
0.25 TABLET ORAL 3 TIMES DAILY PRN
Qty: 90 TABLET | Refills: 5 | Status: SHIPPED | OUTPATIENT
Start: 2018-01-01 | End: 2018-01-01

## 2018-01-01 RX ORDER — HYDROCODONE BITARTRATE AND ACETAMINOPHEN 5; 325 MG/1; MG/1
1 TABLET ORAL 3 TIMES DAILY PRN
Refills: 0 | COMMUNITY
Start: 2018-01-01 | End: 2018-01-01

## 2018-01-01 RX ORDER — VALACYCLOVIR HYDROCHLORIDE 1 G/1
1 TABLET, FILM COATED ORAL 3 TIMES DAILY
Qty: 21 TABLET | Refills: 0 | Status: SHIPPED | OUTPATIENT
Start: 2018-01-01 | End: 2019-01-01

## 2018-01-01 RX ORDER — ALBUTEROL SULFATE 90 UG/1
2 AEROSOL, METERED RESPIRATORY (INHALATION) EVERY 6 HOURS PRN
Qty: 1 INHALER | Refills: 1 | Status: SHIPPED | OUTPATIENT
Start: 2018-01-01

## 2018-01-01 RX ORDER — DRONABINOL 2.5 MG/1
2.5 CAPSULE ORAL
Qty: 60 CAPSULE | Refills: 0 | Status: SHIPPED | OUTPATIENT
Start: 2018-01-01

## 2018-01-01 RX ORDER — HYDROCODONE BITARTRATE AND ACETAMINOPHEN 10; 325 MG/1; MG/1
1-2 TABLET ORAL EVERY 6 HOURS PRN
Qty: 180 TABLET | Refills: 0 | Status: SHIPPED | OUTPATIENT
Start: 2018-01-01

## 2018-01-01 RX ORDER — CYANOCOBALAMIN 1000 UG/ML
1000 INJECTION INTRAMUSCULAR; SUBCUTANEOUS ONCE
Status: CANCELLED | OUTPATIENT
Start: 2019-01-01

## 2018-01-01 RX ORDER — DEXAMETHASONE 4 MG/1
4 TABLET ORAL 2 TIMES DAILY WITH MEALS
Qty: 60 TABLET | Refills: 0 | Status: SHIPPED | OUTPATIENT
Start: 2018-01-01 | End: 2018-01-01 | Stop reason: ALTCHOICE

## 2018-01-01 RX ORDER — DEXAMETHASONE 4 MG/1
TABLET ORAL 3 TIMES DAILY
COMMUNITY
End: 2018-01-01

## 2018-01-01 RX ORDER — HYDROCODONE BITARTRATE AND ACETAMINOPHEN 5; 325 MG/1; MG/1
1 TABLET ORAL 3 TIMES DAILY PRN
Qty: 10 TABLET | Refills: 0 | Status: SHIPPED | OUTPATIENT
Start: 2018-01-01 | End: 2018-01-01

## 2018-01-01 RX ORDER — ATORVASTATIN CALCIUM 20 MG/1
20 TABLET, FILM COATED ORAL DAILY
Qty: 30 TABLET | Refills: 11 | Status: SHIPPED | OUTPATIENT
Start: 2018-01-01 | End: 2019-01-30

## 2018-01-01 RX ORDER — ATORVASTATIN CALCIUM 20 MG/1
TABLET, FILM COATED ORAL
Refills: 0 | COMMUNITY
Start: 2018-01-01 | End: 2018-01-01

## 2018-01-01 RX ORDER — HYDROXYZINE HYDROCHLORIDE 25 MG/1
TABLET, FILM COATED ORAL
COMMUNITY
Start: 2018-01-01 | End: 2018-01-01

## 2018-01-01 RX ORDER — ALPRAZOLAM 0.25 MG/1
0.25 TABLET ORAL 3 TIMES DAILY PRN
Qty: 90 TABLET | Refills: 5 | Status: SHIPPED
Start: 2018-01-01

## 2018-01-01 RX ORDER — HYDROCHLOROTHIAZIDE 25 MG/1
25 TABLET ORAL DAILY
Qty: 30 TABLET | Refills: 2 | Status: SHIPPED | OUTPATIENT
Start: 2018-01-01

## 2018-01-01 RX ORDER — ASPIRIN 325 MG
325 TABLET ORAL DAILY
COMMUNITY

## 2018-01-01 RX ORDER — LEVOTHYROXINE SODIUM 112 UG/1
TABLET ORAL
Qty: 90 TABLET | Refills: 1 | Status: SHIPPED | OUTPATIENT
Start: 2018-01-01

## 2018-04-10 NOTE — TELEPHONE ENCOUNTER
Future appt:  None  Last Appointment:  10/9/2017; No f/u recommended    Cholesterol, Total (mg/dL)   Date Value   09/27/2017 220 (H)   ----------  HDL Cholesterol (mg/dL)   Date Value   09/27/2017 69   ----------  LDL Cholesterol (mg/dL)   Date Value   09/27/2017 133 (H)   ----------  Triglycerides (mg/dL)   Date Value   09/27/2017 89   ----------  No results found for: EAG, A1C    Lab Results  Component Value Date   TSH 0.131 (L) 09/27/2017     Last RF:  10/9/2017    No Follow-up on file.

## 2018-07-09 NOTE — TELEPHONE ENCOUNTER
Pt seen per dermatology today, in [facility] for dermatitis. S/s started after pt went camping 2 wks ago. Pt states rash started along hairline, spread into scalp, also also along her sides under her arms, left thigh and right leg.   Pt states itching made s/

## 2018-08-09 NOTE — TELEPHONE ENCOUNTER
Pt states that she traveled in her truck for several hours on a bench seat. Since then she has been unable to sit, stand or walk for very long. She states that she has a hx of back issues. She went to see a Chiropractor and X-rays were done.  She was told t

## 2018-08-18 NOTE — TELEPHONE ENCOUNTER
Informed patient of the following below. Patient is wondering if anything about a bone spur was noted in the xray? Patient states that Chiropractor stated that she had one. Dr Ngozi Rodriguez can you please advise on Monday. Thank you.

## 2018-08-18 NOTE — TELEPHONE ENCOUNTER
----- Message from Aron Brandon MD sent at 8/17/2018  4:57 PM CDT -----  Radiology review shows mod arthritis in neck,  If testing of neck arteries ok, will be able to work with chiropracter

## 2018-08-20 PROBLEM — M50.30 DEGENERATIVE DISC DISEASE, CERVICAL: Status: ACTIVE | Noted: 2018-01-01

## 2018-08-20 NOTE — PROGRESS NOTES
[facility] [place]  PROGRESS NOTE  Chief Complaint:   Patient presents with:  Neck Pain: needs ultrasound of arteries in order for chiropractor to work on neck, medication refills      HPI:   This is a 74year old female coming in for evaluation patient.       Results for orders placed or performed in visit on 13/00/77  -COMP METABOLIC PANEL (14)   Result Value Ref Range   Glucose 110 (H) 70 - 99 mg/dL   Sodium 142 136 - 144 mmol/L   Potassium 3.9 3.6 - 5.1 mmol/L   Chloride 107 101 - 111 mmol/L Ovaries remains  No date: OTHER      Comment: Bilateral breast implant, ovarian cyst removed  Social History:  Smoking status: Former Smoker                                                              Packs/day: 0.00      Years: 0.00         Quit date: 2/ camping earlier this spring. Patient was treated by dermatology and the hives have now resolved.   CARDIOVASCULAR:  Denies chest pain, chest pressure, chest discomfort, palpitations, edema, dyspnea on exertion or at rest.SEE HPI  RESPIRATORY:  Denies short side.  SKIN: No rashes, no skin lesion, no bruising, good turgor. HEART:  Regular rate and rhythm, no murmurs, rubs or gallops.,  No obvious carotid bruits bilaterally. LUNGS: Clear to auscultation bilterally, no rales/rhonchi/wheezing.   CHEST: No tender does provide partial relief for her. Encourage good fluid intake.     Health Maintenance:  Mammogram due on 09/20/1950  FIT Colorectal Screening due on 09/20/2002  Colonoscopy due on 03/23/2014  DEXA Scan due on 09/20/2017  Pneumococcal PPSV23/PCV13 65+ Karla Morfin

## 2018-08-23 NOTE — TELEPHONE ENCOUNTER
Pt notified and verbalized understanding. Dr. Eliu Santiago reviewed the results of the [facility] that pt had done at [facility] [place] on 8/21/18. Pre Dr. Eliu Santiago : Neck arteries are good, may see chiropractor. Report sent to scan.

## 2018-08-28 NOTE — TELEPHONE ENCOUNTER
1. Pt is looking for carotid doppler result. Report was placed into scanning on 8/23- not available for viewing as this time under media. Pt states that Dr. Axel Cardenas is needing results- pt is asking for Dr. Sharon Michele to call result to #[phone].

## 2018-08-28 NOTE — TELEPHONE ENCOUNTER
Requesting to speak to Talya Dong.  Patient did not want to give detail, she said Talya Dong will know what it was about

## 2018-08-29 NOTE — TELEPHONE ENCOUNTER
Dr. Cristin Fung office was notified as below. Left detailed message for pt- asked Yasir Aquino to call with any other questions/concerns.

## 2018-08-29 NOTE — TELEPHONE ENCOUNTER
Doppler studies were  Normal, bilat, phone note on 8/23 says pt was notified.   May notify Dr Shaw José Miguel office

## 2018-09-27 NOTE — TELEPHONE ENCOUNTER
Is having a hard time with [employer] and referral to chiropractic office - requesting to speak to nurse

## 2018-09-28 NOTE — TELEPHONE ENCOUNTER
Pt called stating that she needs a referral retro dated back to 8/6/18 for [facility]. She states that she has had 12 visits there. She was confused when choosing the insurance plans and was using her traditional Medicare card.  She had actu

## 2018-10-01 NOTE — TELEPHONE ENCOUNTER
Pt was not seen till 8/17, for neck problems. Referral can go back to visit but if not in network or approved prior to visit, unsure if will be approved. As all notes are time stamped in computer, it is impossible to do anything different.

## 2018-10-01 NOTE — TELEPHONE ENCOUNTER
Arin notified and verbalized understanding. She will wait to see if approved from 8/17 to present.  Please advise referral.

## 2018-10-24 NOTE — TELEPHONE ENCOUNTER
Called and spoke with the referral department about Chiropractor referral. Spring Boyd in referral department states that her insurance isn't covered in Dr Pamela Hernandez. Spring Boyd gave me who is in the patient's insurance network for patient.       Called and i

## 2018-10-29 NOTE — TELEPHONE ENCOUNTER
Future appt:    Last Appointment:  8/17/2018; No f/u recommended    Cholesterol, Total (mg/dL)   Date Value   09/27/2017 220 (H)     HDL Cholesterol (mg/dL)   Date Value   09/27/2017 69     LDL Cholesterol (mg/dL)   Date Value   09/27/2017 133 (H)     Trig

## 2018-11-16 NOTE — TELEPHONE ENCOUNTER
Please advise refill of Citalopram 20mg.   Last Rx: 10/9/17    Future appt:    Last Appointment:  8/17/2018      Cholesterol, Total (mg/dL)   Date Value   09/27/2017 220 (H)     HDL Cholesterol (mg/dL)   Date Value   09/27/2017 69     LDL Cholesterol (mg/dL

## 2018-12-04 NOTE — TELEPHONE ENCOUNTER
Patient was not able to get pet scan at [address] because of insurance, facility is not affiliated with [facility] patient needs referral to get pet scan done at [facility].

## 2018-12-04 NOTE — TELEPHONE ENCOUNTER
Spoke with Cee Isabel and advised him to call pt's insurance company  To find in network specialists because pt is IHP. He verbalized understanding and will call insurance. Cee Isabel also states that pt has an appt with Dr. Chano Russell (oncology) on Thursday.  They are

## 2018-12-04 NOTE — TELEPHONE ENCOUNTER
referral needed for Dr Christie Marina, appointment with him tomorrow morning 8:30, also sign off on pet scan order?  Please advise

## 2018-12-04 NOTE — TELEPHONE ENCOUNTER
Simran Guerrero returned call and states that pt needs to have a pet scan done for staging of lung cancer.  She states that due to pt's insurance he cannot order the test and have it covered at [facility] so he is asking that Dr. Ivory Baker order it so that is can be do

## 2018-12-04 NOTE — TELEPHONE ENCOUNTER
Ivett Bell states that pt needs the referral to have pet scan done at [facility] because of her insurance. I told him that I have left a message and am awaiting a return call from [facility] at the [facility] to try and figure out what is needed.  He

## 2018-12-04 NOTE — TELEPHONE ENCOUNTER
needs referrel sent so she can get pet scan done as soon as possible at [facility]

## 2018-12-05 NOTE — TELEPHONE ENCOUNTER
has appt there tomorrow   and needs REFERRAL TODAY for eval and treatment    fax#  [phone]                CALL OR FAX TODAY   12/5    this cannot wait for Thursday. ...

## 2018-12-05 NOTE — TELEPHONE ENCOUNTER
Order placed  May schedule appt, but needs to make sure approved prior to test  Please call referral Dept to expedite

## 2018-12-05 NOTE — TELEPHONE ENCOUNTER
Spoke with Adriano Chavarria, pt's son, and he is going to have the oncologist fax over the PET Scan order to my attention.

## 2018-12-05 NOTE — TELEPHONE ENCOUNTER
Oncology referral signed, please help thru approval  Pt should be able to make sppt  Would be helpful if PET scan done first

## 2018-12-05 NOTE — TELEPHONE ENCOUNTER
Referral to Dr. Holly Morrell denied because he is out of network. Janet Pierce notified and asks for a referral to Dr. Loy Guzman (hem/onc) in network. Please advise.

## 2018-12-05 NOTE — TELEPHONE ENCOUNTER
Received fax of order form Oncologist. 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

## 2018-12-05 NOTE — TELEPHONE ENCOUNTER
Naila Talamantes called stating that he spoke with Evelyne Cockayne at Dr. Vincent Boxer office and was told that if they want pt to get in ASAP then Dr. Brandi Martinez needs to call and talk to Dr. Joy Coelho and give him the information regarding the urgency.  Per Naila Talamantes pt has lung cancer that met

## 2018-12-05 NOTE — TELEPHONE ENCOUNTER
Gabi called this morning and states that he spoke with pt's insurance and all the specialists are in network that she is seeing. Closing this encounter due to multiple encounters for same issues. See other phone note dated 12/4/18.

## 2018-12-05 NOTE — TELEPHONE ENCOUNTER
Unable to find Dr Shana Stack, always need speciality with referral request.  Need to see if Dr is in network, if not in network, referral will not be approved. Due to insurance , will only be able to see in-network physicians.    Verify dose of lovenox

## 2018-12-05 NOTE — TELEPHONE ENCOUNTER
Referral to see Dr. Cindy Enriquez faxed to [facility] with a note stating that authorization is still pending per Epic.

## 2018-12-06 NOTE — TELEPHONE ENCOUNTER
All records received from [facility] and faxed to Dr. Jose Francisco Venegas at [phone]. Shanika Najera notified and verbalized understanding. Requests that Lovenox script be sent to [facility] because they have a full box of syringes.  The computer will not let

## 2018-12-06 NOTE — TELEPHONE ENCOUNTER
Letter completed, please fax. PET scan is now being managed by oncology. Needs to stay on loveox, not coumadin.

## 2018-12-06 NOTE — TELEPHONE ENCOUNTER
Deo Hernandez notified that letter has been sent and that pt needs to remain on Lovenox. He states that Lovenox is 80mg BID. Pt needs 13 doses to get her through until her appt with Oncologist. Please send to [facility].  Script pending with correct pharmacy

## 2018-12-06 NOTE — TELEPHONE ENCOUNTER
needs on [facility] letter head with Dr Aretha Dickey on it to     request for records for Heena Recinos : 1950  STAT     from [facility]      fax to [phone] attn: [place] / Medical Records                Please call back when records arrive

## 2018-12-06 NOTE — TELEPHONE ENCOUNTER
Dayo Nath notified and verbalized understanding. He is working on getting records from [facility]. Referral to Dr. Rutledge Child and MRI Brain results faxed to her office at number listed below.  Dayo Nath informed that I will call him back as soon as Dr. Drew Atkins gets a ca

## 2018-12-07 NOTE — TELEPHONE ENCOUNTER
Pt definitely needs to remain on thyroid med daily.   May need levels checked once into treatment for lung

## 2018-12-07 NOTE — TELEPHONE ENCOUNTER
Please advise refill. This was prescribed by a Dr. Collins Hampton while she was in the hospital. Per Med Reconciliation in [facility] she was prescribed Norco 5-325mg on 11/30, #40 (14 day supply).

## 2018-12-07 NOTE — TELEPHONE ENCOUNTER
Pt called back stating that she has enough Norco for 3 days. Script pending - please check quantity. She states that she thinks in the hospital they were giving her an extra pill here and there.

## 2018-12-07 NOTE — TELEPHONE ENCOUNTER
Patient got list of medication from the hospital, on that list Levothyroxine was listed. Patient hasn't been taking that medication and was wondering if she's suppose to take it.

## 2018-12-07 NOTE — TELEPHONE ENCOUNTER
Margoth Duarte states that when she was given the discharge med list it did not have Levothyroxine on it. She states that she has not taken it in 13 days. She would like to know if she should be? Please advise.

## 2018-12-11 NOTE — TELEPHONE ENCOUNTER
Patient was prescribed #60 with 5 refills of alprazolam on 10/29/18. Patient should not need a refill. Pharmacist states that patient has requested to  a refill and it is too soon.      States due to her new diagnosis of brain cancer patient

## 2018-12-12 NOTE — CONSULTS
Cancer Center Report of Consultation    Patient Name: Nilam Ramesh   YOB: 1950   Medical Record Number: [id number]   CSN: [id number]   Consulting Physician: Manolo Diego MD  Referring Physician(s): Dr. Beverley Cantor paratracheal node which was nonspecific. She had dopplers of the LE which showed LLE DVT. An IVC filter was placed and was started on SUBQ heparin. NSG was consulted and eventually she was placed on Lovenox.      CT guided biopsy of the left lung mass was p Bilateral breast implant, ovarian cyst removed   • OTHER SURGICAL HISTORY      breast augmentation       Family Medical History:  Family History   Problem Relation Age of Onset   • Heart Disease Mother    • Cancer Mother         lung cancer   • Other (Lung times daily as needed. , Disp: 90 tablet, Rfl: 0  •  ALPRAZOLAM 0.25 MG Oral Tab, Take 1 tablet (0.25 mg total) by mouth 3 (three) times daily as needed for Sleep., Disp: 90 tablet, Rfl: 5  •  dexamethasone (DECADRON) 4 MG tablet, Take 1 tablet (4 mg total) induration. Good bowel sounds. Extremities: Pedal pulses are present. BLE edema L>R. Neurological: Grossly intact. Lymphatics: There is no palpable lymphadenopathy throughout in the cervical, supraclavicular, axillary, or inguinal regions.   Psych/Depre 09/27/2017 0902    Sodium 140 02/04/2017 0825    Potassium 3.9 08/17/2018 1639    Potassium 4.7 09/27/2017 0902    Potassium 4.4 02/04/2017 0825    Chloride 107 08/17/2018 1639    Chloride 103 09/27/2017 0902    Chloride 105 02/04/2017 0825    CO2 27.0 08/ lobe extending from the hilum to the apex. This measures 3.6 x 5.4 x 6.5 cm (image 70)  The SUV max measures 6.9. This is compatible with the recently diagnosed lung cancer. There   is a small left pleural effusion. This is not hypermetabolic.   MEDIAST clinically              Dictated by (CST): Shiela Dewitt MD on 12/12/2018 at 11:47       Approved by (CST): Shiela Dewitt MD on 12/12/2018 at 12:32         Impression & Plan:  1.  Newly diagnosed metastatic lung cancer- her case was presented at t continues on Lovenox. She absolutely hates these injections as has had significant pain in her injection sites and she says she has no other areas where she can further receive injections w/o having pain.  She was tearful about this and says she desperately general treatment was explained to the patient and the family.       MD [facility]

## 2018-12-12 NOTE — PATIENT INSTRUCTIONS
Dr. Luis Manuel Zhang nurse line Monday through Friday 84:30:  [phone]  After hours or weekends for emergent needs:  [phone].      To schedule diagnostic testing, Central [phone]  For Medical Records: [phone]
BACK PAIN

## 2018-12-12 NOTE — PROGRESS NOTES
EROS met with patient and her family. Patient has been diagnosed with metastatic lung cancer to the brain. EROS went over the Medical Cann[employer] application. They are going to get this completed and will mail into [place].  EROS completed the physician portion

## 2018-12-13 NOTE — CONSULTS
[facility]    PATIENT'S NAME: Reagan Cast MAYELA   RADIATION ONCOLOGIST: Fabiola Montgomery. Steffanie Jones M.D.    PATIENT ACC[place]: [de-identified] [place]   [facility]   MEDICAL RECORD #: [id number] YOB: 1950   CONSULTATION DATE: 12/12/2018 She also has some back pain, which is understandable, given her PET scan findings and her diffuse vertebral body disease. However, she states that this is fairly well controlled with Norco, and her pain level is low.     Complicating matters somewhat i There is no supraclavicular, axillary, or inguinal lymphadenopathy. ABDOMEN:  Benign. IMPRESSION:  This is a 80-year-old female with a recent diagnosis of fairly diffusely metastatic lung cancer.   She will be undergoing chemotherapy under Dr. Carmela Knowles discussion regarding the risks and benefits of the stereotactic radiosurgery, the patient indicated that she understood all these issues and does wish to proceed with treatment as I previously dictated.     Given that her insurance will be changing after Noti

## 2018-12-13 NOTE — PROGRESS NOTES
STUDY: [facility]  ID # [id number]  CONSENT NOTE     Patient presents with new diagnosis of NSCLC. Unknown EGFR status. Biopsy performed on 12/5/18 confirming diagnosis. PET scan showing metastatic disease.   Dr. Regine Metz spoke to patient and family regarding

## 2018-12-17 NOTE — TELEPHONE ENCOUNTER
Patient's son, Derrell Hicks, calling asking for referral for Radiation from Dr. Denia Tomas. Had CT SIM done this morning. Spoke to Cite Dionisio Knowles from billing about this.   Per Manisha once Radiation has decided on the plan for patient's treatment then she will send in another r

## 2018-12-18 NOTE — PROGRESS NOTES
ANP Visit Note    Patient Name: Lindy Curiel   YOB: 1950   Medical Record Number: [id number]   CSN: [id number]   Date of visit: 12/17/2018       Chief Complaint/Reason for Visit:  Patient presents with:  Edema: APN assessment; lower testing were pending.      She was seen by Oncology during her hospital stay and a PET scan was recommended outpatient. Her insurance requires that she come here for treatment.  Her insurance will be changing as of January 1st.      She had her PET scan her History   Problem Relation Age of Onset   • Heart Disease Mother    • Cancer Mother         lung cancer   • Other (Lung cancer) Mother    • Other (Coronary artery disease) Mother    • Heart Disease Father    • Other (Coronary artery disease) Father    • St Rfl: 3  •  HYDROcodone-acetaminophen 5-325 MG Oral Tab, Take 1 tablet by mouth 3 (three) times daily as needed. , Disp: 90 tablet, Rfl: 0  •  ALPRAZOLAM 0.25 MG Oral Tab, Take 1 tablet (0.25 mg total) by mouth 3 (three) times daily as needed for Sleep., Dis L>R  Neurological: Grossly intact. Lymphatics: There is no palpable lymphadenopathy throughout in the cervical,supraclavicular, axillary, or inguinal regions. Psych/Depression: mood and affect are appropriate.      Labs:  Patient Active Problem List  POC

## 2018-12-18 NOTE — TELEPHONE ENCOUNTER
Spoke to pt's son today after  having had a conversation with him yesterday as well. PT's son Jessica Patel is adamant about having been told by Dr. Stephanie Espinoza that his mother who had her SIM yesterday for radiation therapy mapping, would start RT today.

## 2018-12-19 NOTE — TELEPHONE ENCOUNTER
Son was told that we have not received the labs that are supposed to come in from [facility].  Also told that the application for medical marijuana has been filled out by [facility], signed by Dr. Nishi Wills, and mailed to [facility] based on 12/12/18 noted f

## 2018-12-19 NOTE — TELEPHONE ENCOUNTER
Estrella Santiago notified and that results were received. He states that some results are pending still and they will be faxed on Friday.

## 2018-12-19 NOTE — TELEPHONE ENCOUNTER
Re:   let him know when we recieve test results from other facility.   - They are faxing them over for Dr. Marianna Lawrence

## 2018-12-19 NOTE — PROGRESS NOTES
EROS reached out to resource in [facility] who advises that cannabis card has been approved, and the patient should receive the card in about 14 business days. Patient's son Kwadwo Bae advised.

## 2018-12-20 NOTE — TELEPHONE ENCOUNTER
Spoke to Haroldo from [facility] Pathology Dept to follow up the Lung panel results. Per Haroldo, they are still working with a rep, they needed more material and did not have enough, will work on a partial sample. Dr. Regine Metz aware.  Also updated pa

## 2018-12-21 PROBLEM — C34.92 PRIMARY MALIGNANT NEOPLASM OF LEFT LUNG METASTATIC TO OTHER SITE (HCC): Status: ACTIVE | Noted: 2018-01-01

## 2018-12-21 NOTE — TELEPHONE ENCOUNTER
Spoke to [employer] results showed no mutations. Latest update from path at [facility] today was they did not have enough tissue to send for PDL-1 testing and could only send part of mutation panel- just ROS1 and ALK could be sent.  I explained to her

## 2018-12-21 NOTE — TELEPHONE ENCOUNTER
Spoke to patient's son, Otto Dalton. Wanted to verify what Dr. Ellen Whiet told the patient. Read Dr. Meghan Soto notes to him.  Also informed that per [facility] pathology department, Lung panel (ROS-1 and ALK) should be back on Monday as they were told by the

## 2018-12-21 NOTE — TELEPHONE ENCOUNTER
Verbal Consent -  Ok to speak to [employer]. She wants to make sure he is also aware of everything going on with her. I told her she will have paper work to update when she comes in next - she understood.

## 2018-12-27 NOTE — TELEPHONE ENCOUNTER
Spoke to Dee from [employer] (spoke to Haroldo earlier) to give IR a heads up that Dr. Carmela Knowles also ordered for biopsy for this patient and would requests that this is done the same day that she gets her port placed. Dee to leave a message to Haroldo.  Aware cami

## 2018-12-27 NOTE — PATIENT INSTRUCTIONS
Lovenox injection until Sunday. Start Eliquis 5 mg on Monday - take 10 mg (2 tablets of 5 mg)  twice daily for 7 days, then 5 mg twice daily until further orders.   If still on Lovenox prior when scheduled for PORT placement, will need to stop Lovenox 1 da

## 2018-12-27 NOTE — PROGRESS NOTES
[facility] Progress Note    Patient Name: Marc Carson   YOB: 1950   Medical Record Number: [id number]   CSN: [id number]   Attending Physician: Zoltan Johnson M.D.    Referring Physician: Darvin Riggs MD      Date of Visit: 12/ mucin producing. Mucicarmine was positive. IHC showed the tumor was positive for CK7, napsin,  and negative for CK20, TTF-1, WT1, CK 5/6 and p63. Profile was felt to be compatible with lung primary but breast and female GYN could not be excluded.  August Ee (Coronary artery disease) Mother    • Heart Disease Father    • Other (Coronary artery disease) Father    • Stroke Paternal Grandmother        Psychosocial History:  Social History    Socioeconomic History      Marital status:       Spouse name: Not hydrochlorothiazide 25 MG Oral Tab, Take 1 tablet (25 mg total) by mouth daily. , Disp: 30 tablet, Rfl: 2  •  Pantoprazole Sodium 40 MG Oral Tab EC, Take 1 tablet (40 mg total) by mouth every morning before breakfast., Disp: 30 tablet, Rfl: 3  •  HYDROcodon No palpable lymphadenopathy. Neck is supple. Chest: Diminished BS Left  Heart: Regular rate and rhythm. Abdomen: Soft, injection sites tender with some bruising noted and palpable areas on induration. Good bowel sounds.   Extremities: Pedal pulses are pr 79 12/28/2018 1221    GFR, -American 70 08/17/2018 1639    GFR, Non- 69 12/28/2018 1221    GFR, Non- 61 08/17/2018 1639    Calcium, Total 8.4 12/28/2018 1221    Calcium, Total 9.0 08/17/2018 1639    Calcium, Total 9.3 2.0 max SUV, and hepatic blood pool is 3.3 max SUV. HEAD/NECK:    There is misregistration the head secondary to patient motion. There is asymmetrically increased activity in a right jugular lymph node.   This does not persist on the non attenuation co cancer  2. Hypermetabolic activity in the left hilum is suspicious for left hilar involvement  3. Hypermetabolic left adrenal mass is highly suspicious for adrenal metastasis  4. Widespread osseous metastases in the pelvis, vertebral column and ribs  5.  Co tries to avoid taking more than 2-3 tablets a day. Encouraged her to do so, Renewed prescription for Norco. Will have palliative care see as well.  If pain medications and with initiating systemic therapy she continues to have significant pain will consider

## 2018-12-27 NOTE — TELEPHONE ENCOUNTER
Son informed that chemotherapy has been authorized and that the patient is scheduled for this on 1/3/19 at 10 AM.  Called IR and left a message for them to schedule patient for PORT placement.  Also informed the son that Dr. Natalia Stafford would also like to have

## 2018-12-27 NOTE — PROGRESS NOTES
[facility] Radiation Treatment Management Note 1-5    Patient:  Keyur Cason  Age:  79year old  Visit Diagnosis:    1.  Secondary cancer of brain [facility])      Primary Rad/Onc:  Dr. Willam White    Site Delivered Dose (Gy)

## 2018-12-28 NOTE — PROGRESS NOTES
Chemotherapy Education    Learner:  Patient, Spouse and Family Member    Barriers / Limitations:  Pain and Drowsy from medication    Chemotherapy education goals:  · Learn the drug names  · Administration schedule  · Routes of administration  · Treatment s menstrual irregularity and vaginal dryness:  Achieved    Notify MD/RN of any changes or problems:  Achieved    Comments:    Treatment Effects on Emotional Status:    Potential mood changes, depression, nervousness, difficulty sleeping:  Achieved    Importa passing out  Achieved      Teaching Materials Provided:     [employer] Chemotherapy information sheets   ACS Nutrition for the Person with Cancer during Treatment / Dietician information sheet  When to contact the Treatment Team Information Sheet  Side Effe

## 2018-12-30 PROBLEM — C79.51 BONE METASTASES: Status: ACTIVE | Noted: 2018-01-01

## 2018-12-30 PROBLEM — C79.51 BONE METASTASES (HCC): Status: ACTIVE | Noted: 2018-01-01

## 2018-12-31 NOTE — TELEPHONE ENCOUNTER
called, asked if ok to hold aspirin for biopsy and PAC, told him yes. She will get last dose of Lovenox on Tuesday night at 5 pm, instructed to start Eliquis 5 mg bid on Thursday am. Marinol did not help her symptoms. Will stop.

## 2019-01-01 ENCOUNTER — TELEPHONE (OUTPATIENT)
Dept: HEMATOLOGY/ONCOLOGY | Facility: HOSPITAL | Age: 69
End: 2019-01-01

## 2019-01-01 ENCOUNTER — OFFICE VISIT (OUTPATIENT)
Dept: HEMATOLOGY/ONCOLOGY | Facility: HOSPITAL | Age: 69
End: 2019-01-01
Attending: INTERNAL MEDICINE
Payer: MEDICARE

## 2019-01-01 ENCOUNTER — TELEPHONE (OUTPATIENT)
Dept: SURGERY | Facility: CLINIC | Age: 69
End: 2019-01-01

## 2019-01-01 ENCOUNTER — TELEPHONE (OUTPATIENT)
Dept: FAMILY MEDICINE CLINIC | Facility: CLINIC | Age: 69
End: 2019-01-01

## 2019-01-01 ENCOUNTER — HOSPITAL ENCOUNTER (OUTPATIENT)
Dept: CT IMAGING | Facility: HOSPITAL | Age: 69
Discharge: HOME | End: 2019-01-01
Attending: INTERNAL MEDICINE
Payer: MEDICARE

## 2019-01-01 ENCOUNTER — APPOINTMENT (OUTPATIENT)
Dept: LAB | Facility: HOSPITAL | Age: 69
End: 2019-01-01
Attending: INTERNAL MEDICINE
Payer: MEDICARE

## 2019-01-01 ENCOUNTER — HOSPITAL ENCOUNTER (OUTPATIENT)
Dept: RADIATION ONCOLOGY | Facility: HOSPITAL | Age: 69
Discharge: HOME | End: 2019-01-01
Attending: RADIOLOGY
Payer: MEDICARE

## 2019-01-01 ENCOUNTER — HOSPITAL ENCOUNTER (OUTPATIENT)
Dept: INTERVENTIONAL RADIOLOGY/VASCULAR | Facility: HOSPITAL | Age: 69
Discharge: HOME | End: 2019-01-01
Attending: INTERNAL MEDICINE | Admitting: INTERNAL MEDICINE
Payer: MEDICARE

## 2019-01-01 VITALS
HEART RATE: 87 BPM | OXYGEN SATURATION: 98 % | SYSTOLIC BLOOD PRESSURE: 121 MMHG | DIASTOLIC BLOOD PRESSURE: 69 MMHG | RESPIRATION RATE: 19 BRPM

## 2019-01-01 VITALS
RESPIRATION RATE: 16 BRPM | SYSTOLIC BLOOD PRESSURE: 125 MMHG | OXYGEN SATURATION: 95 % | WEIGHT: 180 LBS | HEIGHT: 66 IN | HEART RATE: 68 BPM | DIASTOLIC BLOOD PRESSURE: 72 MMHG | BODY MASS INDEX: 28.93 KG/M2 | TEMPERATURE: 98 F

## 2019-01-01 VITALS
HEIGHT: 65.55 IN | RESPIRATION RATE: 16 BRPM | HEART RATE: 84 BPM | SYSTOLIC BLOOD PRESSURE: 146 MMHG | BODY MASS INDEX: 30.39 KG/M2 | TEMPERATURE: 98 F | OXYGEN SATURATION: 95 % | WEIGHT: 184.63 LBS | DIASTOLIC BLOOD PRESSURE: 73 MMHG

## 2019-01-01 DIAGNOSIS — C79.51 BONE METASTASES (HCC): ICD-10-CM

## 2019-01-01 DIAGNOSIS — C34.82 MALIGNANT NEOPLASM OF OVERLAPPING SITES OF LEFT LUNG (HCC): ICD-10-CM

## 2019-01-01 DIAGNOSIS — C34.82 MALIGNANT NEOPLASM OF OVERLAPPING SITES OF LEFT LUNG (HCC): Primary | ICD-10-CM

## 2019-01-01 LAB
ADEQUACY OF SPECIMEN: ADEQUATE
ALBUMIN SERPL-MCNC: 3.1 G/DL (ref 3.1–4.5)
ALBUMIN/GLOB SERPL: 0.9 {RATIO} (ref 1–2)
ALP LIVER SERPL-CCNC: 198 U/L (ref 55–142)
ALT SERPL-CCNC: 65 U/L (ref 14–54)
ANION GAP SERPL CALC-SCNC: 11 MMOL/L (ref 0–18)
AST SERPL-CCNC: 45 U/L (ref 15–41)
BAND %: 3 %
BASOPHIL % MANUAL: 0 %
BASOPHIL ABSOLUTE MANUAL: 0 X10(3) UL (ref 0–0.1)
BILIRUB SERPL-MCNC: 0.9 MG/DL (ref 0.1–2)
BUN BLD-MCNC: 23 MG/DL (ref 8–20)
BUN/CREAT SERPL: 29.5 (ref 10–20)
CALCIUM BLD-MCNC: 8.5 MG/DL (ref 8.3–10.3)
CHLORIDE SERPL-SCNC: 96 MMOL/L (ref 101–111)
CO2 SERPL-SCNC: 27 MMOL/L (ref 22–32)
CREAT BLD-MCNC: 0.78 MG/DL (ref 0.55–1.02)
EOSINOPHIL % MANUAL: 0 %
EOSINOPHIL ABSOLUTE MANUAL: 0 X10(3) UL (ref 0–0.3)
ERYTHROCYTE [DISTWIDTH] IN BLOOD BY AUTOMATED COUNT: 14.1 % (ref 11.5–16)
ERYTHROCYTE [DISTWIDTH] IN BLOOD BY AUTOMATED COUNT: 14.2 % (ref 11.5–16)
GLOBULIN PLAS-MCNC: 3.4 G/DL (ref 2.8–4.4)
GLUCOSE BLD-MCNC: 123 MG/DL (ref 70–99)
HCT VFR BLD AUTO: 32 % (ref 34–50)
HCT VFR BLD AUTO: 34.6 % (ref 34–50)
HGB BLD-MCNC: 10.6 G/DL (ref 12–16)
HGB BLD-MCNC: 10.9 G/DL (ref 12–16)
INR BLD: 1.02 (ref 0.9–1.1)
LYMPHOCYTE % MANUAL: 5 %
LYMPHOCYTE ABSOLUTE MANUAL: 1.18 X10(3) UL (ref 0.9–4)
M PROTEIN MFR SERPL ELPH: 6.5 G/DL (ref 6.4–8.2)
MCH RBC QN AUTO: 29.9 PG (ref 27–33.2)
MCH RBC QN AUTO: 31.3 PG (ref 27–33.2)
MCHC RBC AUTO-ENTMCNC: 31.5 G/DL (ref 31–37)
MCHC RBC AUTO-ENTMCNC: 33.1 G/DL (ref 31–37)
MCV RBC AUTO: 94.4 FL (ref 81–100)
MCV RBC AUTO: 95.1 FL (ref 81–100)
METAMYELOCYTE %: 1 %
METAMYELOCYTE ABSOLUTE MANUAL: 0.24 X10(3) UL (ref ?–0.01)
MONOCYTE % MANUAL: 2 %
MONOCYTE ABSOLUTE MANUAL: 0.47 X10(3) UL (ref 0.1–1)
MORPHOLOGY: NORMAL
MYELOCYTE %: 1 %
MYELOCYTE ABSOLUTE MANUAL: 0.24 X10(3) UL (ref ?–0.01)
NEUTROPHIL ABS PRELIM: 18.57 X10 (3) UL (ref 1.3–6.7)
NEUTROPHIL ABSOLUTE MANUAL: 21.39 X10(3) UL (ref 1.3–6.7)
NEUTROPHILS % MANUAL: 88 %
OSMOLALITY SERPL CALC.SUM OF ELEC: 283 MOSM/KG (ref 275–295)
PLATELET # BLD AUTO: 123 10(3)UL (ref 150–450)
PLATELET # BLD AUTO: 95 10(3)UL (ref 150–450)
PLATELET MORPHOLOGY: NORMAL
POTASSIUM SERPL-SCNC: 3.5 MMOL/L (ref 3.6–5.1)
PSA SERPL DL<=0.01 NG/ML-MCNC: 13.8 SECONDS (ref 12.4–14.7)
RBC # BLD AUTO: 3.39 X10(6)UL (ref 3.8–5.1)
RBC # BLD AUTO: 3.64 X10(6)UL (ref 3.8–5.1)
RED CELL DISTRIBUTION WIDTH-SD: 47.2 FL (ref 35.1–46.3)
RED CELL DISTRIBUTION WIDTH-SD: 48.3 FL (ref 35.1–46.3)
SODIUM SERPL-SCNC: 134 MMOL/L (ref 136–144)
TOTAL CELLS COUNTED: 100
TSI SER-ACNC: 1.32 MIU/ML (ref 0.35–5.5)
WBC # BLD AUTO: 21.6 X10(3) UL (ref 4–13)
WBC # BLD AUTO: 23.5 X10(3) UL (ref 4–13)

## 2019-01-01 PROCEDURE — 96372 THER/PROPH/DIAG INJ SC/IM: CPT

## 2019-01-01 PROCEDURE — 36415 COLL VENOUS BLD VENIPUNCTURE: CPT

## 2019-01-01 PROCEDURE — 96411 CHEMO IV PUSH ADDL DRUG: CPT

## 2019-01-01 PROCEDURE — B548ZZA ULTRASONOGRAPHY OF SUPERIOR VENA CAVA, GUIDANCE: ICD-10-PCS | Performed by: RADIOLOGY

## 2019-01-01 PROCEDURE — 88360 TUMOR IMMUNOHISTOCHEM/MANUAL: CPT | Performed by: INTERNAL MEDICINE

## 2019-01-01 PROCEDURE — 36561 INSERT TUNNELED CV CATH: CPT

## 2019-01-01 PROCEDURE — 80053 COMPREHEN METABOLIC PANEL: CPT

## 2019-01-01 PROCEDURE — B5181ZA FLUOROSCOPY OF SUPERIOR VENA CAVA USING LOW OSMOLAR CONTRAST, GUIDANCE: ICD-10-PCS | Performed by: RADIOLOGY

## 2019-01-01 PROCEDURE — 85007 BL SMEAR W/DIFF WBC COUNT: CPT

## 2019-01-01 PROCEDURE — 88342 IMHCHEM/IMCYTCHM 1ST ANTB: CPT | Performed by: INTERNAL MEDICINE

## 2019-01-01 PROCEDURE — 99152 MOD SED SAME PHYS/QHP 5/>YRS: CPT | Performed by: INTERNAL MEDICINE

## 2019-01-01 PROCEDURE — 84443 ASSAY THYROID STIM HORMONE: CPT

## 2019-01-01 PROCEDURE — 99153 MOD SED SAME PHYS/QHP EA: CPT

## 2019-01-01 PROCEDURE — 49180 BIOPSY ABDOMINAL MASS: CPT | Performed by: INTERNAL MEDICINE

## 2019-01-01 PROCEDURE — 96413 CHEMO IV INFUSION 1 HR: CPT

## 2019-01-01 PROCEDURE — 88305 TISSUE EXAM BY PATHOLOGIST: CPT | Performed by: INTERNAL MEDICINE

## 2019-01-01 PROCEDURE — 85027 COMPLETE CBC AUTOMATED: CPT

## 2019-01-01 PROCEDURE — 96375 TX/PRO/DX INJ NEW DRUG ADDON: CPT

## 2019-01-01 PROCEDURE — 88341 IMHCHEM/IMCYTCHM EA ADD ANTB: CPT | Performed by: INTERNAL MEDICINE

## 2019-01-01 PROCEDURE — 96417 CHEMO IV INFUS EACH ADDL SEQ: CPT

## 2019-01-01 PROCEDURE — 85025 COMPLETE CBC W/AUTO DIFF WBC: CPT

## 2019-01-01 PROCEDURE — 77001 FLUOROGUIDE FOR VEIN DEVICE: CPT

## 2019-01-01 PROCEDURE — 99152 MOD SED SAME PHYS/QHP 5/>YRS: CPT

## 2019-01-01 PROCEDURE — 96367 TX/PROPH/DG ADDL SEQ IV INF: CPT

## 2019-01-01 PROCEDURE — 85610 PROTHROMBIN TIME: CPT

## 2019-01-01 PROCEDURE — 0JH63WZ INSERTION OF TOTALLY IMPLANTABLE VASCULAR ACCESS DEVICE INTO CHEST SUBCUTANEOUS TISSUE AND FASCIA, PERCUTANEOUS APPROACH: ICD-10-PCS | Performed by: RADIOLOGY

## 2019-01-01 PROCEDURE — 77012 CT SCAN FOR NEEDLE BIOPSY: CPT | Performed by: INTERNAL MEDICINE

## 2019-01-01 PROCEDURE — 76937 US GUIDE VASCULAR ACCESS: CPT

## 2019-01-01 PROCEDURE — 02HV33Z INSERTION OF INFUSION DEVICE INTO SUPERIOR VENA CAVA, PERCUTANEOUS APPROACH: ICD-10-PCS | Performed by: RADIOLOGY

## 2019-01-01 RX ORDER — NALOXONE HYDROCHLORIDE 0.4 MG/ML
80 INJECTION, SOLUTION INTRAMUSCULAR; INTRAVENOUS; SUBCUTANEOUS AS NEEDED
Status: DISCONTINUED | OUTPATIENT
Start: 2019-01-01 | End: 2019-01-01

## 2019-01-01 RX ORDER — LORAZEPAM 0.5 MG/1
TABLET ORAL EVERY 4 HOURS PRN
Status: CANCELLED | OUTPATIENT
Start: 2019-01-01

## 2019-01-01 RX ORDER — ONDANSETRON 2 MG/ML
8 INJECTION INTRAMUSCULAR; INTRAVENOUS EVERY 6 HOURS PRN
Status: CANCELLED | OUTPATIENT
Start: 2019-01-01

## 2019-01-01 RX ORDER — PROCHLORPERAZINE MALEATE 10 MG
10 TABLET ORAL EVERY 6 HOURS PRN
Qty: 30 TABLET | Refills: 3 | Status: CANCELLED | OUTPATIENT
Start: 2019-01-01

## 2019-01-01 RX ORDER — SODIUM CHLORIDE 0.9 % (FLUSH) 0.9 %
10 SYRINGE (ML) INJECTION ONCE
Status: COMPLETED | OUTPATIENT
Start: 2019-01-01 | End: 2019-01-01

## 2019-01-01 RX ORDER — CEFAZOLIN SODIUM 1 G/3ML
INJECTION, POWDER, FOR SOLUTION INTRAMUSCULAR; INTRAVENOUS
Status: DISCONTINUED
Start: 2019-01-01 | End: 2019-01-01 | Stop reason: WASHOUT

## 2019-01-01 RX ORDER — SODIUM CHLORIDE 9 MG/ML
INJECTION, SOLUTION INTRAVENOUS CONTINUOUS
Status: DISCONTINUED | OUTPATIENT
Start: 2019-01-01 | End: 2019-01-01

## 2019-01-01 RX ORDER — LIDOCAINE HYDROCHLORIDE 10 MG/ML
INJECTION, SOLUTION INFILTRATION; PERINEURAL
Status: COMPLETED
Start: 2019-01-01 | End: 2019-01-01

## 2019-01-01 RX ORDER — LORAZEPAM 2 MG/ML
INJECTION INTRAMUSCULAR EVERY 4 HOURS PRN
Status: CANCELLED | OUTPATIENT
Start: 2019-01-01

## 2019-01-01 RX ORDER — CLINDAMYCIN PHOSPHATE 150 MG/ML
INJECTION, SOLUTION INTRAVENOUS
Status: COMPLETED
Start: 2019-01-01 | End: 2019-01-01

## 2019-01-01 RX ORDER — SODIUM CHLORIDE 0.9 % (FLUSH) 0.9 %
10 SYRINGE (ML) INJECTION ONCE
Status: CANCELLED | OUTPATIENT
Start: 2019-01-01

## 2019-01-01 RX ORDER — FOLIC ACID 1 MG/1
1 TABLET ORAL DAILY
Qty: 90 TABLET | Refills: 3 | Status: SHIPPED | OUTPATIENT
Start: 2019-01-01 | End: 2019-01-15

## 2019-01-01 RX ORDER — FLUMAZENIL 0.1 MG/ML
0.2 INJECTION, SOLUTION INTRAVENOUS AS NEEDED
Status: DISCONTINUED | OUTPATIENT
Start: 2019-01-01 | End: 2019-01-01

## 2019-01-01 RX ORDER — BACITRACIN 50000 [USP'U]/1
INJECTION, POWDER, LYOPHILIZED, FOR SOLUTION INTRAMUSCULAR
Status: COMPLETED
Start: 2019-01-01 | End: 2019-01-01

## 2019-01-01 RX ORDER — MIDAZOLAM HYDROCHLORIDE 1 MG/ML
INJECTION INTRAMUSCULAR; INTRAVENOUS
Status: COMPLETED
Start: 2019-01-01 | End: 2019-01-01

## 2019-01-01 RX ORDER — PROCHLORPERAZINE MALEATE 10 MG
10 TABLET ORAL EVERY 6 HOURS PRN
Status: CANCELLED | OUTPATIENT
Start: 2019-01-01

## 2019-01-01 RX ORDER — LIDOCAINE AND PRILOCAINE 25; 25 MG/G; MG/G
CREAM TOPICAL
Qty: 1 TUBE | Refills: 0 | Status: SHIPPED | OUTPATIENT
Start: 2019-01-01

## 2019-01-01 RX ORDER — HEPARIN SODIUM 5000 [USP'U]/ML
INJECTION, SOLUTION INTRAVENOUS; SUBCUTANEOUS
Status: COMPLETED
Start: 2019-01-01 | End: 2019-01-01

## 2019-01-01 RX ORDER — FOLIC ACID 1 MG/1
1 TABLET ORAL DAILY
Qty: 90 TABLET | Refills: 3 | Status: CANCELLED | OUTPATIENT
Start: 2019-01-01

## 2019-01-01 RX ORDER — ONDANSETRON HYDROCHLORIDE 8 MG/1
8 TABLET, FILM COATED ORAL EVERY 8 HOURS PRN
Qty: 30 TABLET | Refills: 3 | Status: SHIPPED | OUTPATIENT
Start: 2019-01-01 | End: 2019-01-15

## 2019-01-01 RX ORDER — CYANOCOBALAMIN 1000 UG/ML
1000 INJECTION INTRAMUSCULAR; SUBCUTANEOUS ONCE
Status: COMPLETED | OUTPATIENT
Start: 2019-01-01 | End: 2019-01-01

## 2019-01-01 RX ORDER — ONDANSETRON HYDROCHLORIDE 8 MG/1
8 TABLET, FILM COATED ORAL EVERY 8 HOURS PRN
Qty: 30 TABLET | Refills: 3 | Status: CANCELLED | OUTPATIENT
Start: 2019-01-01

## 2019-01-01 RX ORDER — PROCHLORPERAZINE MALEATE 10 MG
10 TABLET ORAL EVERY 6 HOURS PRN
Qty: 30 TABLET | Refills: 3 | Status: SHIPPED | OUTPATIENT
Start: 2019-01-01 | End: 2019-01-15

## 2019-01-01 RX ORDER — MIDAZOLAM HYDROCHLORIDE 1 MG/ML
1 INJECTION INTRAMUSCULAR; INTRAVENOUS EVERY 5 MIN PRN
Status: DISCONTINUED | OUTPATIENT
Start: 2019-01-01 | End: 2019-01-01

## 2019-01-01 RX ORDER — METOCLOPRAMIDE HYDROCHLORIDE 5 MG/ML
10 INJECTION INTRAMUSCULAR; INTRAVENOUS EVERY 6 HOURS PRN
Status: CANCELLED | OUTPATIENT
Start: 2019-01-01

## 2019-01-01 RX ADMIN — MIDAZOLAM HYDROCHLORIDE 0.5 MG: 1 INJECTION INTRAMUSCULAR; INTRAVENOUS at 08:25:00

## 2019-01-01 RX ADMIN — MIDAZOLAM HYDROCHLORIDE 0.5 MG: 1 INJECTION INTRAMUSCULAR; INTRAVENOUS at 08:29:00

## 2019-01-01 RX ADMIN — SODIUM CHLORIDE 0.9 % (FLUSH) 10 ML: 0.9 % SYRINGE (ML) INJECTION at 14:00:00

## 2019-01-01 RX ADMIN — CYANOCOBALAMIN 1000 MCG: 1000 INJECTION INTRAMUSCULAR; SUBCUTANEOUS at 10:58:00

## 2019-01-01 RX ADMIN — SODIUM CHLORIDE: 9 INJECTION, SOLUTION INTRAVENOUS at 08:15:00

## 2019-01-01 RX ADMIN — MIDAZOLAM HYDROCHLORIDE 1 MG: 1 INJECTION INTRAMUSCULAR; INTRAVENOUS at 08:23:00

## 2019-01-02 PROBLEM — C34.82 MALIGNANT NEOPLASM OF OVERLAPPING SITES OF LEFT LUNG (HCC): Status: ACTIVE | Noted: 2018-01-01

## 2019-01-02 NOTE — OR NURSING
Duane, RN from IR here to transport patient, report given. Patient transferred in stable condition with belongings and family at bedside.

## 2019-01-02 NOTE — OR NURSING
Dr. Katy Camarena called and updated on patient conditon. Patient doing well, VSS,  site c/d/i, denies any pain to site. C/o chronic pain at times to tailbone area, but denies the need for pain medication. Patient remains NPO.  Ok to transfer patient to IR for proced

## 2019-01-02 NOTE — H&P
[address] Patient Status:  Outpatient    1950 MRN [id number]   Location [facility] Attending Monique Harris MD   Hosp Day # 0 PCP See Espinoza MD     Admitting Diagnosis:   Adrenal ma daily for 7 days. , Disp: 21 tablet, Rfl: 0  •  fluconazole 100 MG Oral Tab, Two tablets on the first day and then one tablet daily. , Disp: 8 tablet, Rfl: 1  •  AmLODIPine Besylate 10 MG Oral Tab, Take 1 tablet (10 mg total) by mouth daily. , Disp: 90 tablet NAD  Neck: No JVD  Lungs: CTA bilat  Heart: RRR, S1, S2  Abdomen: Soft, NT/ND, BS+x4  Extremities: Warm, dry, no LE edema bilat  Pulses: 2+ bilat DP    Results:   Labs:  Recent Labs   Lab  12/28/18   1221  01/02/19   0710   RBC  3.84  3.64*   HGB  12.2  10

## 2019-01-02 NOTE — IMAGING NOTE
Post left adrenal gland bx. Tolerated procedure and sedation well. Denies any discomfort post procedure. tegaderm middle of back to left of spine CDI. Report called to [facility]. Alert and oriented upon departure to [facility].

## 2019-01-02 NOTE — OR NURSING
Patient resting comfortably,sleeping, family at bedside. Site to back c/d/i. Denies the need for any pain medication. Will monitor.

## 2019-01-02 NOTE — PROGRESS NOTES
S/p PAC. Pt A/Ox4. GU. HOB elevated. Right chest incision with mepilex dressing, CDI. Denies any pain. VSS. Voiding without difficulty. Off bedrest at 1345, ambulated in unit with SBA. Discharge instructions given, pt and family verbalized understanding.

## 2019-01-03 NOTE — TELEPHONE ENCOUNTER
I have order the foundation 1 and have faxed progress note as well billing info. Im waiting for the final path report to send. However in the mean monica they can request the tissue.

## 2019-01-03 NOTE — PROGRESS NOTES
Pt here for C 1 D 1.   Arrives Ambulating independently, accompanied by Spouse and Family member           Modifications in dose or schedule: No     Frequency of blood return and site check throughout administration: Prior to administration and At [facility].

## 2019-01-04 NOTE — TELEPHONE ENCOUNTER
Date of Treatment:  1/3/19                                Chemo: Pembro/carbo. alimta    Comments: LM for patient on personal VM. Recommendations:   Confirmed next scheduled appointment (s).

## 2019-01-07 ENCOUNTER — HOSPITAL (OUTPATIENT)
Dept: OTHER | Age: 69
End: 2019-01-07
Attending: INTERNAL MEDICINE

## 2019-01-07 LAB
A/G RATIO_: 0.9
ABG GLU: 112 MG/G (ref 65–95)
ABG HCT: 29 % (ref 37–50)
ABG ION CALCIUM: 4.5 MG/DL (ref 4.5–5.3)
ABG K: 3.37 MMOL/L (ref 3.5–5.3)
ABG NA: 128.9 MMOL/L (ref 135–148)
ABS LYMPH MAN: 0.8 K/CUMM (ref 1–3.5)
ABS MONO MAN: 0 K/CUMM (ref 0.1–0.8)
ABS NEUT MAN: 11.8 K/CUMM (ref 1.8–7.8)
ALBUMIN: 2.8 G/DL (ref 3.5–5)
ALK PHOS: 170 UNIT/L (ref 50–124)
ALLEN'S TEST: POSITIVE
ALT/GPT: 143 UNIT/L (ref 0–55)
ANALYZER: ABNORMAL
ANION GAP SERPL CALC-SCNC: 16 MEQ/L (ref 10–20)
APTT PPP: 23 SECONDS (ref 22–30)
AST/GOT: 46 UNIT/L (ref 5–34)
BAND MAN: 1 % (ref 2–8)
BASOPHIL MAN: 0 % (ref 0–1)
BEVT: 5.4 MMOL/L (ref -2–3)
BILI TOTAL: 1 MG/DL (ref 0.2–1)
BUN SERPL-MCNC: 22 MG/DL (ref 6–20)
CALCIUM: 8.7 MG/DL (ref 8.4–10.2)
CHLORIDE: 94 MEQ/L (ref 97–107)
COHB: 1 % (ref 0.5–1.5)
CREATININE: 0.71 MG/DL (ref 0.6–1.3)
DEVICE SN: ABNORMAL
DIFF_TYPE?: ABNORMAL
DRAW SITE: ABNORMAL
EOS MAN: 0 % (ref 0–4)
GLOBULIN_: 3 G/DL (ref 2–4.1)
GLUCOSE LVL: 140 MG/DL (ref 70–99)
HCO3: 26.9 MMOL/L (ref 22–26)
HCT VFR BLD CALC: 28 % (ref 33–45)
HEMOLYSIS 2+: NEGATIVE
HEMOLYSIS 4+: NEGATIVE
HGB BLD-MCNC: 9.4 G/DL (ref 11–15)
ICTERIC 4+: NEGATIVE
INHALED O2 CONCENTRATION: 21 %
INR: 1.1 (ref 0.9–1.1)
INSTR WBC: 12.5 K/CUMM (ref 4–11)
LIPEMIC 3+: NEGATIVE
LYMPH MAN: 6 %
MAGNESIUM LEVEL: 2.1 MG/DL (ref 1.6–2.6)
MCH RBC QN AUTO: 32 PG (ref 25–35)
MCHC RBC AUTO-ENTMCNC: 34 G/DL (ref 32–37)
MCV RBC AUTO: 94 FL (ref 78–97)
METHB: 0.3 % (ref 0–1.5)
MODE: ABNORMAL
MONOCYTE MAN: 0 %
NRBC BLD MANUAL-RTO: 0.2 % (ref 0–0.2)
O2 SAT(EST): 92.9 %
O2HB: 91.7 % (ref 94–97)
PCO2: 28.8 MMHG (ref 35–45)
PH: 7.59 (ref 7.35–7.45)
PLATELET: 42 K/CUMM (ref 150–450)
PLT ESTIMATE: ABNORMAL
PO2: 65.3 MMHG (ref 75–100)
POC_GLU: 145 MG/DL (ref 70–99)
POTASSIUM: 2.8 MEQ/L (ref 3.5–5.1)
POTASSIUM: 3.6 MEQ/L (ref 3.5–5.1)
PROTHROMBIN TIME: 11.5 SECONDS (ref 9.7–11.6)
RBC # BLD: 2.97 M/CUMM (ref 3.7–5.2)
RBC MORPH: ABNORMAL
RDW: 15.3 % (ref 11.5–14.5)
SAMPLE TYPE: ABNORMAL
SEGS MAN: 93 %
SODIUM: 134 MEQ/L (ref 136–145)
TCO2: 27 MEQ/L (ref 19–29)
TECH ID: 9545
TECH_ID: ABNORMAL
THB: 9.8 G/DL (ref 12–18)
TOTAL LYMPHS MANUAL: 6 %
TOTAL PROTEIN: 5.8 G/DL (ref 6.4–8.3)
TROPONIN I: 0.24 NG/ML
TROPONIN I: 0.32 NG/ML
TROPONIN I: 0.33 NG/ML
TSH SERPL-ACNC: 1.4 MIU/ML (ref 0.4–5)
WBC # BLD: 12.5 K/CUMM (ref 4–11)

## 2019-01-07 PROCEDURE — 93010 ELECTROCARDIOGRAM REPORT: CPT | Performed by: INTERNAL MEDICINE

## 2019-01-08 LAB
A/G RATIO_: 0.7
ABS LYMPH: 0.4 K/CUMM (ref 1–3.5)
ABS MONO: 0 K/CUMM (ref 0.1–0.8)
ABS NEUTRO: 6.6 K/CUMM (ref 2–8)
ALBUMIN: 2.3 G/DL (ref 3.5–5)
ALK PHOS: 145 UNIT/L (ref 50–124)
ALT/GPT: 107 UNIT/L (ref 0–55)
ANION GAP SERPL CALC-SCNC: 14 MEQ/L (ref 10–20)
AST/GOT: 32 UNIT/L (ref 5–34)
BASOPHIL: 0 % (ref 0–1)
BILI DIRECT: 0.4 MG/DL (ref 0–0.5)
BILI TOTAL: 0.9 MG/DL (ref 0.2–1)
BUN SERPL-MCNC: 19 MG/DL (ref 6–20)
CALCIUM: 8.6 MG/DL (ref 8.4–10.2)
CHLORIDE: 99 MEQ/L (ref 97–107)
CHOLESTEROL: 180 MG/DL
CREATININE: 0.6 MG/DL (ref 0.6–1.3)
DEVICE SN: ABNORMAL
DIFF_TYPE?: ABNORMAL
EOSINOPHIL: 0 % (ref 0–6)
GLOBULIN_: 3.2 G/DL (ref 2–4.1)
GLUCOSE LVL: 158 MG/DL (ref 70–99)
HCT VFR BLD CALC: 26 % (ref 33–45)
HDLC SERPL-MCNC: 66 MG/DL (ref 40–60)
HEMOLYSIS 2+: NEGATIVE
HEMOLYSIS 3+: NEGATIVE
HEMOLYSIS 4+: NEGATIVE
HGB BLD-MCNC: 8.7 G/DL (ref 11–15)
ICTERIC 4+: NEGATIVE
IMMATURE GRAN: 1.5 % (ref 0–0.3)
INSTR WBC: 7.1 K/CUMM (ref 4–11)
LDLC SERPL CALC-MCNC: 88 MG/DL
LIPEMIC 2+: NEGATIVE
LIPEMIC 4+: NEGATIVE
LYMPHOCYTE: 6 %
MAGNESIUM LEVEL: 2 MG/DL (ref 1.6–2.6)
MCH RBC QN AUTO: 31 PG (ref 25–35)
MCHC RBC AUTO-ENTMCNC: 34 G/DL (ref 32–37)
MCV RBC AUTO: 94 FL (ref 78–97)
MONOCYTE: 0 %
NEUTROPHIL: 92 %
NRBC BLD MANUAL-RTO: 0 % (ref 0–0.2)
PHOSPHORUS: 2.7 MG/DL (ref 2.3–4.7)
PLATELET: 32 K/CUMM (ref 150–450)
POC_GLU: 147 MG/DL (ref 70–99)
POC_GLU: 152 MG/DL (ref 70–99)
POC_GLU: 160 MG/DL (ref 70–99)
POTASSIUM: 3.4 MEQ/L (ref 3.5–5.1)
RBC # BLD: 2.77 M/CUMM (ref 3.7–5.2)
RDW: 15.1 % (ref 11.5–14.5)
SODIUM: 135 MEQ/L (ref 136–145)
TCO2: 25 MEQ/L (ref 19–29)
TECH_ID: ABNORMAL
TOTAL PROTEIN: 5.5 G/DL (ref 6.4–8.3)
TRIGL SERPL-MCNC: 129 MG/DL
TROPONIN I: 0.18 NG/ML
WBC # BLD: 7.1 K/CUMM (ref 4–11)

## 2019-01-09 LAB
A/G RATIO_: 0.8
ABS LYMPH: 0.7 K/CUMM (ref 1–3.5)
ABS MONO: 0 K/CUMM (ref 0.1–0.8)
ABS NEUTRO: 4 K/CUMM (ref 2–8)
ALBUMIN: 2.5 G/DL (ref 3.5–5)
ALK PHOS: 138 UNIT/L (ref 50–124)
ALT/GPT: 84 UNIT/L (ref 0–55)
ANION GAP SERPL CALC-SCNC: 14 MEQ/L (ref 10–20)
AST/GOT: 24 UNIT/L (ref 5–34)
BASOPHIL: 0 % (ref 0–1)
BILI DIRECT: 0.4 MG/DL (ref 0–0.5)
BILI TOTAL: 0.9 MG/DL (ref 0.2–1)
BUN SERPL-MCNC: 26 MG/DL (ref 6–20)
CALCIUM: 8.5 MG/DL (ref 8.4–10.2)
CHLORIDE: 105 MEQ/L (ref 97–107)
CREATININE: 0.63 MG/DL (ref 0.6–1.3)
DEVICE SN: ABNORMAL
DIFF_TYPE?: ABNORMAL
EOSINOPHIL: 0 % (ref 0–6)
GLOBULIN_: 3.1 G/DL (ref 2–4.1)
GLUCOSE LVL: 148 MG/DL (ref 70–99)
GLUCOSE LVL: 168 MG/DL (ref 70–99)
HCT VFR BLD CALC: 22 % (ref 33–45)
HEMOLYSIS 2+: NEGATIVE
HGB BLD-MCNC: 7.3 G/DL (ref 11–15)
IMMATURE GRAN: 3.9 % (ref 0–0.3)
INSTR WBC: 4.9 K/CUMM (ref 4–11)
LIPEMIC 2+: NEGATIVE
LYMPHOCYTE: 15 %
MAGNESIUM LEVEL: 2 MG/DL (ref 1.6–2.6)
MCH RBC QN AUTO: 31 PG (ref 25–35)
MCHC RBC AUTO-ENTMCNC: 33 G/DL (ref 32–37)
MCV RBC AUTO: 95 FL (ref 78–97)
MONOCYTE: 1 %
NEUTROPHIL: 80 %
NRBC BLD MANUAL-RTO: 0.4 % (ref 0–0.2)
PLATELET: 26 K/CUMM (ref 150–450)
POC_GLU: 141 MG/DL (ref 70–99)
POC_GLU: 160 MG/DL (ref 70–99)
POC_GLU: 162 MG/DL (ref 70–99)
POTASSIUM: 4.1 MEQ/L (ref 3.5–5.1)
RBC # BLD: 2.33 M/CUMM (ref 3.7–5.2)
RDW: 15.3 % (ref 11.5–14.5)
SODIUM: 138 MEQ/L (ref 136–145)
TCO2: 23 MEQ/L (ref 19–29)
TECH_ID: ABNORMAL
TOTAL PROTEIN: 5.6 G/DL (ref 6.4–8.3)
WBC # BLD: 4.9 K/CUMM (ref 4–11)

## 2019-01-10 LAB
ABS LYMPH MAN: 0.1 K/CUMM (ref 1–3.5)
ABS MONO MAN: 0.2 K/CUMM (ref 0.1–0.8)
ABS NEUT MAN: 3.1 K/CUMM (ref 1.8–7.8)
ANION GAP SERPL CALC-SCNC: 16 MEQ/L (ref 10–20)
BASOPHIL MAN: 0 % (ref 0–1)
BUN SERPL-MCNC: 31 MG/DL (ref 6–20)
CALCIUM: 8.4 MG/DL (ref 8.4–10.2)
CHLORIDE: 108 MEQ/L (ref 97–107)
CREATININE: 0.64 MG/DL (ref 0.6–1.3)
DEVICE SN: ABNORMAL
DIFF_TYPE?: ABNORMAL
EOS MAN: 1 % (ref 0–4)
GLUCOSE LVL: 139 MG/DL (ref 70–99)
HCT VFR BLD CALC: 24 % (ref 33–45)
HEMOLYSIS 2+: NEGATIVE
HGB BLD-MCNC: 8 G/DL (ref 11–15)
INSTR WBC: 3.5 K/CUMM (ref 4–11)
LYMPH MAN: 4 %
MCH RBC QN AUTO: 32 PG (ref 25–35)
MCHC RBC AUTO-ENTMCNC: 33 G/DL (ref 32–37)
MCV RBC AUTO: 96 FL (ref 78–97)
MONOCYTE MAN: 6 %
NRBC BLD MANUAL-RTO: 0.6 % (ref 0–0.2)
PLATELET: 19 K/CUMM (ref 150–450)
PLT ESTIMATE: ABNORMAL
POC_GLU: 131 MG/DL (ref 70–99)
POC_GLU: 136 MG/DL (ref 70–99)
POC_GLU: 153 MG/DL (ref 70–99)
POTASSIUM: 3.9 MEQ/L (ref 3.5–5.1)
RBC # BLD: 2.51 M/CUMM (ref 3.7–5.2)
RBC MORPH: ABNORMAL
RDW: 15.5 % (ref 11.5–14.5)
SEGS MAN: 89 %
SODIUM: 140 MEQ/L (ref 136–145)
TCO2: 20 MEQ/L (ref 19–29)
TECH_ID: ABNORMAL
TOTAL LYMPHS MANUAL: 4 %
WBC # BLD: 3.5 K/CUMM (ref 4–11)

## 2019-01-11 NOTE — TELEPHONE ENCOUNTER
Would like to talk to  in regards to patient and hospiace services. Patient was diagnosed with lung cancer in November.

## 2019-01-11 NOTE — TELEPHONE ENCOUNTER
Spoke with Christine Cifuentes from [employer]. She states that pt was discharged last night from the hospital and started on Hospice.  She states that the [employer] can take care of all the comfort meds and orders with the Hospice MD. They would like to lis

## 2019-01-14 NOTE — PROGRESS NOTES
[address]    [facility]   RADIATION ONCOLOGIST: Maris Fierro. Heather Jamison M.D.    [facility]: [facility]   MEDICAL RECORD #: [id number] YOB: 1950   DATE: 12/28/2018       RADIATION ON 2200 cGy in a solitary fraction. TOLERANCE:  The patient tolerated treatment well and had no particular side effects or difficulties from therapy. She completed her 2 treatments without any breaks or interruptions.     FOLLOWUP AND PLAN:  The patient w

## 2019-01-16 ENCOUNTER — TELEPHONE (OUTPATIENT)
Dept: HEMATOLOGY/ONCOLOGY | Facility: HOSPITAL | Age: 69
End: 2019-01-16

## 2019-01-16 NOTE — TELEPHONE ENCOUNTER
Called to express condolences- home phone appears to have been disconnected. Spoke to son Nafisa Grace.

## 2019-01-24 ENCOUNTER — APPOINTMENT (OUTPATIENT)
Dept: HEMATOLOGY/ONCOLOGY | Facility: HOSPITAL | Age: 69
End: 2019-01-24
Attending: INTERNAL MEDICINE
Payer: MEDICARE

## 2019-01-24 ENCOUNTER — TELEPHONE (OUTPATIENT)
Dept: FAMILY MEDICINE CLINIC | Facility: CLINIC | Age: 69
End: 2019-01-24

## 2019-01-25 ENCOUNTER — TELEPHONE (OUTPATIENT)
Dept: HEMATOLOGY/ONCOLOGY | Facility: HOSPITAL | Age: 69
End: 2019-01-25

## 2019-01-25 NOTE — TELEPHONE ENCOUNTER
Per Hershal Mcburney - Patient previously had labs ordered by Dr. Chang Benitez ( oncology) for [facility]- a lab work up of 150 tests. [facility] lab was out of insurance network and the test cost about $9,000. It has been denied coverage.  He was told by Ruchi

## 2019-10-19 NOTE — TELEPHONE ENCOUNTER
Spoke with Dr Sharon Michele and pt's son. Records to be faxed from doctor, son to bring in disc. Pt had CT chest, MRI brain, and needle biopsy--postive for lung cancer  Appt given for PET and consult. Cherri Pardo Excessive energy intake, physical inactivity.

## (undated) NOTE — MR AVS SNAPSHOT
[facility] [address] [place] [address],  [address]  [phone]               Thank you for choosing us for your health care visit with Andrea Crawford MD.  We are glad to serve you and happy to provide you with this summary of yo These medications were sent to [facility] [phone], [phone]  [facility] [address], [address] [facility] [address]     Phone:  [phone]    - alprazolam 0.25 MG Tabs  - Citalopram Hydrobromide 20 MG T Eat plenty of protein, keep the fat content low Sugars:  sodas and sports drinks, candies and desserts   Eat plenty of low-fat dairy products High fat meats and dairy   Choose whole grain products Foods high in sodium   Water is best for hydration Fast daily

## (undated) NOTE — MR AVS SNAPSHOT
[facility] [address] [place] [address],  [address]  [phone]               Thank you for choosing us for your health care visit with Caron Collazo MD.  We are glad to serve you and happy to provide you with this summary of yo nystatin-triamcinolone 118277-5.8 UNIT/GM-% Crea   Apply to affected area twice a day   Commonly known as:  MYCOLOG II           Oxybutynin Chloride ER 5 MG Tb24   Take 1 tablet (5 mg total) by mouth daily.    Commonly known as:  DITROPAN XL

## (undated) NOTE — LETTER
12/6/2018              [address]         To Whom It May Concern,    Manuel Kumar was recently admitted to your hospital after period of confusion and diagnosed stage IV lung cancer.   We

## (undated) NOTE — LETTER
Printed: 2018    Patient Name: Tabatha Dickens  : 1950   Medical Record #: [id number]    Consent to Cancer Treatment    I, Tabatha Dickens, understand that I have been diagnosed with lung cancer (stage IV).     I understand the option of no treatment. I also understand that I may stop this treatment at any time. I have had the chance to ask questions about this treatment, and my questions have been answered to my satisfaction.   I understand that I can contact my oncologis

## (undated) NOTE — LETTER
[facility] [address], [address]  Consent for Procedure/Sedation    Date:     Time:       1. I authorize the performance upon [employer] the following:  VENOUS ACCESS PORT IMPLANT     2.  I authorize Dr. Temple Files (and whome ________________________________    ___________________    Witness: _________________________      Date: ___________________    Printed: 2018   4:24 PM  Patient Name: Justyna Seaman        : 1950       Medical Record #: [id number]

## (undated) NOTE — LETTER
[address], [address] [facility]            August 11, 2017      [address] [place] [address]      Dear Ms. Honey Gil,     It has come to